# Patient Record
Sex: MALE | Race: OTHER | Employment: FULL TIME | URBAN - METROPOLITAN AREA
[De-identification: names, ages, dates, MRNs, and addresses within clinical notes are randomized per-mention and may not be internally consistent; named-entity substitution may affect disease eponyms.]

---

## 2021-06-29 ENCOUNTER — APPOINTMENT (OUTPATIENT)
Dept: GENERAL RADIOLOGY | Age: 31
DRG: 988 | End: 2021-06-29

## 2021-06-29 ENCOUNTER — HOSPITAL ENCOUNTER (INPATIENT)
Age: 31
LOS: 2 days | Discharge: HOME | DRG: 988 | End: 2021-07-01
Attending: EMERGENCY MEDICINE | Admitting: INTERNAL MEDICINE

## 2021-06-29 DIAGNOSIS — L03.012 CELLULITIS OF LEFT THUMB: Primary | ICD-10-CM

## 2021-06-29 DIAGNOSIS — Z23 TETANUS TOXOID VACCINATION ADMINISTERED AT CURRENT VISIT: ICD-10-CM

## 2021-06-29 PROBLEM — L03.119 CELLULITIS OF HAND: Status: ACTIVE | Noted: 2021-06-29

## 2021-06-29 LAB
A/G RATIO: 1.2 (ref 1.1–2.2)
ALBUMIN SERPL-MCNC: 4.4 G/DL (ref 3.4–5)
ALP BLD-CCNC: 76 U/L (ref 40–129)
ALT SERPL-CCNC: 20 U/L (ref 10–40)
ANION GAP SERPL CALCULATED.3IONS-SCNC: 12 MMOL/L (ref 3–16)
AST SERPL-CCNC: 18 U/L (ref 15–37)
BASOPHILS ABSOLUTE: 0 K/UL (ref 0–0.2)
BASOPHILS RELATIVE PERCENT: 0.2 %
BILIRUB SERPL-MCNC: 0.9 MG/DL (ref 0–1)
BUN BLDV-MCNC: 11 MG/DL (ref 7–20)
CALCIUM SERPL-MCNC: 9.9 MG/DL (ref 8.3–10.6)
CHLORIDE BLD-SCNC: 102 MMOL/L (ref 99–110)
CO2: 24 MMOL/L (ref 21–32)
CREAT SERPL-MCNC: 0.8 MG/DL (ref 0.9–1.3)
EOSINOPHILS ABSOLUTE: 0.1 K/UL (ref 0–0.6)
EOSINOPHILS RELATIVE PERCENT: 1 %
GFR AFRICAN AMERICAN: >60
GFR NON-AFRICAN AMERICAN: >60
GLOBULIN: 3.7 G/DL
GLUCOSE BLD-MCNC: 107 MG/DL (ref 70–99)
HCT VFR BLD CALC: 40.5 % (ref 40.5–52.5)
HEMOGLOBIN: 14 G/DL (ref 13.5–17.5)
LYMPHOCYTES ABSOLUTE: 1 K/UL (ref 1–5.1)
LYMPHOCYTES RELATIVE PERCENT: 8.1 %
MCH RBC QN AUTO: 30.8 PG (ref 26–34)
MCHC RBC AUTO-ENTMCNC: 34.5 G/DL (ref 31–36)
MCV RBC AUTO: 89.1 FL (ref 80–100)
MONOCYTES ABSOLUTE: 1 K/UL (ref 0–1.3)
MONOCYTES RELATIVE PERCENT: 8.1 %
NEUTROPHILS ABSOLUTE: 9.9 K/UL (ref 1.7–7.7)
NEUTROPHILS RELATIVE PERCENT: 82.6 %
PDW BLD-RTO: 13.2 % (ref 12.4–15.4)
PLATELET # BLD: 218 K/UL (ref 135–450)
PMV BLD AUTO: 8.6 FL (ref 5–10.5)
POTASSIUM REFLEX MAGNESIUM: 3.6 MMOL/L (ref 3.5–5.1)
RBC # BLD: 4.55 M/UL (ref 4.2–5.9)
SARS-COV-2, NAAT: NOT DETECTED
SODIUM BLD-SCNC: 138 MMOL/L (ref 136–145)
TOTAL PROTEIN: 8.1 G/DL (ref 6.4–8.2)
WBC # BLD: 12.1 K/UL (ref 4–11)

## 2021-06-29 PROCEDURE — 2500000003 HC RX 250 WO HCPCS: Performed by: INTERNAL MEDICINE

## 2021-06-29 PROCEDURE — 80053 COMPREHEN METABOLIC PANEL: CPT

## 2021-06-29 PROCEDURE — 73130 X-RAY EXAM OF HAND: CPT

## 2021-06-29 PROCEDURE — 96365 THER/PROPH/DIAG IV INF INIT: CPT

## 2021-06-29 PROCEDURE — 96375 TX/PRO/DX INJ NEW DRUG ADDON: CPT

## 2021-06-29 PROCEDURE — 6360000002 HC RX W HCPCS: Performed by: INTERNAL MEDICINE

## 2021-06-29 PROCEDURE — 99252 IP/OBS CONSLTJ NEW/EST SF 35: CPT | Performed by: NURSE PRACTITIONER

## 2021-06-29 PROCEDURE — 85025 COMPLETE CBC W/AUTO DIFF WBC: CPT

## 2021-06-29 PROCEDURE — 6360000002 HC RX W HCPCS: Performed by: EMERGENCY MEDICINE

## 2021-06-29 PROCEDURE — 1200000000 HC SEMI PRIVATE

## 2021-06-29 PROCEDURE — 2580000003 HC RX 258: Performed by: INTERNAL MEDICINE

## 2021-06-29 PROCEDURE — 99283 EMERGENCY DEPT VISIT LOW MDM: CPT

## 2021-06-29 PROCEDURE — 90471 IMMUNIZATION ADMIN: CPT | Performed by: EMERGENCY MEDICINE

## 2021-06-29 PROCEDURE — 90715 TDAP VACCINE 7 YRS/> IM: CPT | Performed by: EMERGENCY MEDICINE

## 2021-06-29 PROCEDURE — 87635 SARS-COV-2 COVID-19 AMP PRB: CPT

## 2021-06-29 PROCEDURE — 2500000003 HC RX 250 WO HCPCS: Performed by: EMERGENCY MEDICINE

## 2021-06-29 RX ORDER — KETOROLAC TROMETHAMINE 30 MG/ML
30 INJECTION, SOLUTION INTRAMUSCULAR; INTRAVENOUS ONCE
Status: COMPLETED | OUTPATIENT
Start: 2021-06-29 | End: 2021-06-29

## 2021-06-29 RX ORDER — SODIUM CHLORIDE 9 MG/ML
25 INJECTION, SOLUTION INTRAVENOUS PRN
Status: DISCONTINUED | OUTPATIENT
Start: 2021-06-29 | End: 2021-07-01 | Stop reason: HOSPADM

## 2021-06-29 RX ORDER — SODIUM CHLORIDE 0.9 % (FLUSH) 0.9 %
5-40 SYRINGE (ML) INJECTION EVERY 12 HOURS SCHEDULED
Status: DISCONTINUED | OUTPATIENT
Start: 2021-06-29 | End: 2021-07-01 | Stop reason: HOSPADM

## 2021-06-29 RX ORDER — CLINDAMYCIN PHOSPHATE 600 MG/50ML
600 INJECTION INTRAVENOUS ONCE
Status: COMPLETED | OUTPATIENT
Start: 2021-06-29 | End: 2021-06-29

## 2021-06-29 RX ORDER — ONDANSETRON 2 MG/ML
4 INJECTION INTRAMUSCULAR; INTRAVENOUS EVERY 6 HOURS PRN
Status: DISCONTINUED | OUTPATIENT
Start: 2021-06-29 | End: 2021-07-01 | Stop reason: HOSPADM

## 2021-06-29 RX ORDER — POTASSIUM CHLORIDE 7.45 MG/ML
10 INJECTION INTRAVENOUS PRN
Status: DISCONTINUED | OUTPATIENT
Start: 2021-06-29 | End: 2021-07-01 | Stop reason: HOSPADM

## 2021-06-29 RX ORDER — POTASSIUM CHLORIDE 20 MEQ/1
40 TABLET, EXTENDED RELEASE ORAL PRN
Status: DISCONTINUED | OUTPATIENT
Start: 2021-06-29 | End: 2021-07-01 | Stop reason: HOSPADM

## 2021-06-29 RX ORDER — MAGNESIUM SULFATE IN WATER 40 MG/ML
2000 INJECTION, SOLUTION INTRAVENOUS PRN
Status: DISCONTINUED | OUTPATIENT
Start: 2021-06-29 | End: 2021-07-01 | Stop reason: HOSPADM

## 2021-06-29 RX ORDER — ONDANSETRON 4 MG/1
4 TABLET, ORALLY DISINTEGRATING ORAL EVERY 8 HOURS PRN
Status: DISCONTINUED | OUTPATIENT
Start: 2021-06-29 | End: 2021-07-01 | Stop reason: HOSPADM

## 2021-06-29 RX ORDER — CLINDAMYCIN PHOSPHATE 600 MG/50ML
600 INJECTION INTRAVENOUS EVERY 8 HOURS
Status: DISCONTINUED | OUTPATIENT
Start: 2021-06-29 | End: 2021-07-01 | Stop reason: HOSPADM

## 2021-06-29 RX ORDER — ACETAMINOPHEN 650 MG/1
650 SUPPOSITORY RECTAL EVERY 6 HOURS PRN
Status: DISCONTINUED | OUTPATIENT
Start: 2021-06-29 | End: 2021-07-01 | Stop reason: HOSPADM

## 2021-06-29 RX ORDER — ACETAMINOPHEN 325 MG/1
650 TABLET ORAL EVERY 6 HOURS PRN
Status: DISCONTINUED | OUTPATIENT
Start: 2021-06-29 | End: 2021-07-01 | Stop reason: HOSPADM

## 2021-06-29 RX ORDER — POLYETHYLENE GLYCOL 3350 17 G/17G
17 POWDER, FOR SOLUTION ORAL DAILY PRN
Status: DISCONTINUED | OUTPATIENT
Start: 2021-06-29 | End: 2021-07-01 | Stop reason: HOSPADM

## 2021-06-29 RX ORDER — SODIUM CHLORIDE 0.9 % (FLUSH) 0.9 %
5-40 SYRINGE (ML) INJECTION PRN
Status: DISCONTINUED | OUTPATIENT
Start: 2021-06-29 | End: 2021-07-01 | Stop reason: HOSPADM

## 2021-06-29 RX ADMIN — TETANUS TOXOID, REDUCED DIPHTHERIA TOXOID AND ACELLULAR PERTUSSIS VACCINE, ADSORBED 0.5 ML: 5; 2.5; 8; 8; 2.5 SUSPENSION INTRAMUSCULAR at 15:14

## 2021-06-29 RX ADMIN — KETOROLAC TROMETHAMINE 30 MG: 30 INJECTION, SOLUTION INTRAMUSCULAR at 15:14

## 2021-06-29 RX ADMIN — Medication 10 ML: at 20:52

## 2021-06-29 RX ADMIN — ENOXAPARIN SODIUM 40 MG: 40 INJECTION SUBCUTANEOUS at 20:49

## 2021-06-29 RX ADMIN — CLINDAMYCIN PHOSPHATE 600 MG: 600 INJECTION, SOLUTION INTRAVENOUS at 22:59

## 2021-06-29 RX ADMIN — CLINDAMYCIN PHOSPHATE 600 MG: 600 INJECTION, SOLUTION INTRAVENOUS at 15:16

## 2021-06-29 ASSESSMENT — PAIN DESCRIPTION - DESCRIPTORS: DESCRIPTORS: THROBBING;DISCOMFORT

## 2021-06-29 ASSESSMENT — PAIN DESCRIPTION - PROGRESSION: CLINICAL_PROGRESSION: GRADUALLY WORSENING

## 2021-06-29 ASSESSMENT — PAIN DESCRIPTION - LOCATION: LOCATION: HAND

## 2021-06-29 ASSESSMENT — PAIN SCALES - GENERAL
PAINLEVEL_OUTOF10: 7
PAINLEVEL_OUTOF10: 5
PAINLEVEL_OUTOF10: 5

## 2021-06-29 ASSESSMENT — PAIN DESCRIPTION - ORIENTATION: ORIENTATION: LEFT

## 2021-06-29 ASSESSMENT — PAIN DESCRIPTION - FREQUENCY: FREQUENCY: CONTINUOUS

## 2021-06-29 ASSESSMENT — PAIN DESCRIPTION - ONSET: ONSET: ON-GOING

## 2021-06-29 ASSESSMENT — PAIN DESCRIPTION - PAIN TYPE: TYPE: ACUTE PAIN

## 2021-06-29 ASSESSMENT — PAIN - FUNCTIONAL ASSESSMENT: PAIN_FUNCTIONAL_ASSESSMENT: PREVENTS OR INTERFERES SOME ACTIVE ACTIVITIES AND ADLS

## 2021-06-29 NOTE — ED NOTES
Bed: 22  Expected date:   Expected time:   Means of arrival:   Comments:  Kip Julio RN  06/29/21 2572

## 2021-06-29 NOTE — H&P
Hospital Medicine History & Physical      PCP: No primary care provider on file. Date of Admission: 6/29/2021    Date of Service: Pt seen/examined on 6/29/2021 and Admitted to Inpatient with expected LOS greater than two midnights due to medical therapy. Chief Complaint: Left first digit cellulitis      History Of Present Illness: 31 y.o. male with no past medical history presents to [facility] ER with swelling redness and pain in the left first digit. Patient states that redness and swelling started Friday/Saturday. Does not recall any trauma or other insult. Has been getting progressively worse since then. Today he became concerned with the amount of swelling decided to come to the ER. Patient has no other complaints. No fever, no chills, no chest pain, no shortness of breath, no abdominal complaints. Orthopedic surgery consulted from the ED, recommended admission for IV antibiotics and possible or tomorrow. Past Medical History:      History reviewed. No pertinent past medical history. Past Surgical History:      History reviewed. No pertinent surgical history. Medications Prior to Admission:      Prior to Admission medications    Not on File       Allergies:  Patient has no known allergies. Social History:      The patient currently lives home    TOBACCO:   reports that he has been smoking cigarettes. He has been smoking about 1.00 pack per day. He has never used smokeless tobacco.  ETOH:   reports previous alcohol use. E-Cigarettes/Vaping Use     Questions Responses    E-Cigarette/Vaping Use     Start Date     Passive Exposure     Quit Date     Counseling Given     Comments             Family History:       Reviewed in detail and negative for DM, CAD, Cancer, CVA. Positive as follows:    History reviewed. No pertinent family history. REVIEW OF SYSTEMS:   Pertinent positives as noted in the HPI. All other systems reviewed and negative.     PHYSICAL EXAM PERFORMED:    BP 99/71   Pulse 73   Temp 98.2 °F (36.8 °C) (Oral)   Resp 16   Ht 6' 2\" (1.88 m)   Wt 250 lb (113.4 kg)   SpO2 99%   BMI 32.10 kg/m²     General appearance:  No apparent distress, appears stated age and cooperative. HEENT:  Normal cephalic, atraumatic without obvious deformity. Pupils equal, round, and reactive to light. Extra ocular muscles intact. Conjunctivae/corneas clear. Neck: Supple, with full range of motion. No jugular venous distention. Trachea midline. Respiratory:  Normal respiratory effort. Clear to auscultation, bilaterally without Rales/Wheezes/Rhonchi. Cardiovascular:  Regular rate and rhythm with normal S1/S2 without murmurs, rubs or gallops. Abdomen: Soft, non-tender, non-distended with normal bowel sounds. Musculoskeletal: Left first digit and thenar swelling redness and heat consistent with acute infection. Skin: Skin color, texture, turgor normal.  No rashes or lesions. Neurologic:  Neurovascularly intact without any focal sensory/motor deficits. Cranial nerves: II-XII intact, grossly non-focal.  Psychiatric:  Alert and oriented, thought content appropriate, normal insight  Capillary Refill: Brisk,< 3 seconds   Peripheral Pulses: +2 palpable, equal bilaterally       Labs:     Recent Labs     06/29/21  1512   WBC 12.1*   HGB 14.0   HCT 40.5        Recent Labs     06/29/21  1512      K 3.6      CO2 24   BUN 11   CREATININE 0.8*   CALCIUM 9.9     Recent Labs     06/29/21  1512   AST 18   ALT 20   BILITOT 0.9   ALKPHOS 76     No results for input(s): INR in the last 72 hours. No results for input(s): Reyne Sami in the last 72 hours.     Urinalysis:    No results found for: Milton Parent, BACTERIA, [facility], BLOODU, [address], [facility]    Radiology:     CXR: I have reviewed the CXR with the following interpretation: Not available  EKG:  I have reviewed the EKG with the following interpretation: Not available    XR HAND LEFT (MIN 3 VIEWS)   Final Result   No bony abnormality             ASSESSMENT:    Active Hospital Problems    Diagnosis Date Noted    Cellulitis of hand [L03.119] 2021         PLAN:    Left hand cellulitis  Started on clindamycin in the ED, will continue  IV fluids  N.p.o. post midnight for possible OR  Orthopedic surgery consultation    DVT Prophylaxis: Lovenox  Diet: Diet NPO  ADULT DIET;  Regular  Code Status: Full Code    Electronically signed by Rosamaria Mcintyre MD on 2021 at 5:28 PM

## 2021-06-29 NOTE — ED NOTES
Patient alert and oriented, states pain is 5/10. Medicated per MAR. Bed locked and in lowest position. Call light within reach. Patient advised to call before getting out of bed.      Reyna Alvarez RN  06/29/21 [id number]

## 2021-06-29 NOTE — ED PROVIDER NOTES
[address] [place] [address]  Chief Complaint   Patient presents with    Cellulitis     Pt with cellulitis to left thumb, states he was bit by something on Saturday, noticed swelling, warmth, and redness on Sunday. HISTORY OF PRESENT ILLNESS  Barrett Weir is a 31 y.o. male who presents to the ED complaining of left thumb infection and pain. He says he sleeps in the basement and may have been bit by a spider or something like that on Saturday when he first noticed the symptoms although did not see an insect at all. Since then he has had progression throughout the entire left of swelling redness and discomfort, inability to flex the IP joint and limited range of motion of the MCP joint as well because of his swelling and redness. He also notes on the palmar surface of the thumb yellowish discoloration to the skin. No fevers. Right hand dominant. Redness extends to the left thenar eminence but nothing tracking beyond the wrist or involving the other fingers. No other complaints, modifying factors or associated symptoms. Nursing notes reviewed. History reviewed. No pertinent past medical history. History reviewed. No pertinent surgical history. History reviewed. No pertinent family history.   Social History     Socioeconomic History    Marital status: Not on file     Spouse name: Not on file    Number of children: Not on file    Years of education: Not on file    Highest education level: Not on file   Occupational History    Not on file   Tobacco Use    Smoking status: Current Every Day Smoker     Packs/day: 1.00     Types: Cigarettes    Smokeless tobacco: Never Used   Substance and Sexual Activity    Alcohol use: Not Currently    Drug use: Yes     Types: Marijuana    Sexual activity: Not on file   Other Topics Concern    Not on file   Social History Narrative    Not on file     Social Determinants of Health     Financial Resource Strain:     Difficulty of Paying Living Expenses:    Food Insecurity:     Worried About [address] in the Last Year:     [address] in the Last Year:    Transportation Needs:     Lack of Transportation (Medical):  Lack of Transportation (Non-Medical):    Physical Activity:     Days of Exercise per Week:     Minutes of Exercise per Session:    Stress:     Feeling of Stress :    Social Connections:     Frequency of Communication with Friends and Family:     Frequency of Social Gatherings with Friends and Family:     Attends [employer] Services:     Active Member of Clubs or Organizations:     Attends Club or Organization Meetings:     Marital Status:    Intimate Partner Violence:     Fear of Current or Ex-Partner:     Emotionally Abused:     Physically Abused:     Sexually Abused:      No current facility-administered medications for this encounter. No current outpatient medications on file. No Known Allergies    REVIEW OF SYSTEMS  6 systems reviewed, pertinent positives per HPI otherwise noted to be negative    PHYSICAL EXAM   BP (!) 147/93   Pulse 94   Temp 98.2 °F (36.8 °C) (Oral)   Resp 16   Ht 6' 2\" (1.88 m)   Wt 250 lb (113.4 kg)   SpO2 98%   BMI 32.10 kg/m²    GENERAL APPEARANCE: Awake and alert. Cooperative. No acute distress. HEAD: Normocephalic. Atraumatic. EYES: PERRL. EOM's grossly intact. ENT: Mucous membranes are moist.   NECK: Supple. Normal ROM. CHEST: Equal symmetric chest rise. LUNGS: Breathing is unlabored. Speaking comfortably in full sentences. ABDOMEN: Nondistended, nontender  SKIN: Warm and dry. No acute rashes. NEUROLOGICAL: Alert and oriented. Strength is 5/5 in all extremities and sensation is intact. MUSCULOSKELETAL:  RUE: No tenderness. 2+ radial pulse. Brisk cap refill x5 digits. Sensation and motor function fully intact in the radial, ulnar, and median nerve distribution. Full range of motion of all major joints.   Cardinal movements of hand fully intact. No erythema, bruising, or lacerations. Comparments are soft. LUE: Left thumb as depicted below. On the palmar surface there is a yellowish discoloration concerning for purulence. There is limited IP and MCP joint range of motion and tenderness over the flexor tendons of the left thumb as well as erythema spreading to the thenar eminence but no proximal streaking beyond there. No other phalanges are tender erythematous swollen or seemingly involved. There does not appear to be any specific swelling or inflammation to the nailbed itself or obvious paronychia or felon. Aside from the left thumb, no other LUE tenderness. 2+ radial pulse. Brisk cap refill x5 digits. Sensation and motor function fully intact in the radial, ulnar, and median nerve distribution. Full range of motion of all other major joints. Cardinal movements of hand fully intact. No erythema, bruising, or lacerations. Comparments are soft. RADIOLOGY    XR HAND LEFT (MIN 3 VIEWS)    Result Date: 6/29/2021  EXAMINATION: THREE XRAY VIEWS OF THE LEFT HAND 6/29/2021 3:10 pm COMPARISON: None. HISTORY: ORDERING SYSTEM PROVIDED HISTORY: infxn left thumb/MCP TECHNOLOGIST PROVIDED HISTORY: Reason for exam:->infxn left thumb/MCP Reason for Exam: Cellulitis (Pt with cellulitis to left thumb, states he was bit by something on Saturday, noticed swelling, warmth, and redness on Sunday. ) Acuity: Acute Type of Exam: Initial FINDINGS: No fracture or dislocation. No destructive bone lesion or periostitis. No radiopaque foreign body or soft tissue gas. Soft tissue swelling of the thumb noted     No bony abnormality       LABS  I have reviewed all labs for this visit.    Results for orders placed or performed during the hospital encounter of 06/29/21   CBC auto differential   Result Value Ref Range    WBC 12.1 (H) 4.0 - 11.0 K/uL    RBC 4.55 4.20 - 5.90 M/uL    Hemoglobin 14.0 13.5 - 17.5 g/dL    Hematocrit 40.5 40.5 - 52.5 %    MCV 89.1 80.0 - 100.0 fL    MCH 30.8 26.0 - 34.0 pg    MCHC 34.5 31.0 - 36.0 g/dL    RDW 13.2 12.4 - 15.4 %    Platelets 548 976 - 685 K/uL    MPV 8.6 5.0 - 10.5 fL    Neutrophils % 82.6 %    Lymphocytes % 8.1 %    Monocytes % 8.1 %    Eosinophils % 1.0 %    Basophils % 0.2 %    Neutrophils Absolute 9.9 (H) 1.7 - 7.7 K/uL    Lymphocytes Absolute 1.0 1.0 - 5.1 K/uL    Monocytes Absolute 1.0 0.0 - 1.3 K/uL    Eosinophils Absolute 0.1 0.0 - 0.6 K/uL    Basophils Absolute 0.0 0.0 - 0.2 K/uL   Comprehensive Metabolic Panel w/ Reflex to MG   Result Value Ref Range    Sodium 138 136 - 145 mmol/L    Potassium reflex Magnesium 3.6 3.5 - 5.1 mmol/L    Chloride 102 99 - 110 mmol/L    CO2 24 21 - 32 mmol/L    Anion Gap 12 3 - 16    Glucose 107 (H) 70 - 99 mg/dL    BUN 11 7 - 20 mg/dL    CREATININE 0.8 (L) 0.9 - 1.3 mg/dL    GFR Non-African American >60 >60    GFR African American >60 >60    Calcium 9.9 8.3 - 10.6 mg/dL    Total Protein 8.1 6.4 - 8.2 g/dL    Albumin 4.4 3.4 - 5.0 g/dL    Albumin/Globulin Ratio 1.2 1.1 - 2.2    Total Bilirubin 0.9 0.0 - 1.0 mg/dL    Alkaline Phosphatase 76 40 - 129 U/L    ALT 20 10 - 40 U/L    AST 18 15 - 37 U/L    Globulin 3.7 g/dL     ED COURSE/MDM  Differential diagnosis considerations included: cellulitis, abscess, necrotizing fasciitis, ulceration, flexor tenosynovitis    The patient's ED course was notable for some cellulitis with some concern for flexor tenosynovitis or abscess. Patient vitals are reassuring not indicative of sepsis or systemic illness. He is a borderline white count of 12.1. Otherwise labs are unremarkable. Tetanus was updated. Liz Beckett from orthopedics was consulted about the patient's ED history, physical, workup, and course so far.   Recommendations from this consultant included prompt ED assessment, after which we discussed the case and agreement with clindamycin that was ordered by me, plan to admit to the hospitalist and reassess in the morning to consider operative intervention if not improved with conservative management. CLINICAL IMPRESSION  1. Cellulitis of left thumb    2. Tetanus toxoid vaccination administered at current visit        Blood pressure (!) 147/93, pulse 94, temperature 98.2 °F (36.8 °C), temperature source Oral, resp. rate 16, height 6' 2\" (1.88 m), weight 250 lb (113.4 kg), SpO2 98 %. DISPOSITION    The plan is to admit to the hospital at this time under the hospitalist service. Hospitalist accepted the patient and will take over the patient's care. This chart was created using Dragon dictation software. Efforts were made by me to ensure accuracy, however some errors may be present due to limitations of this technology.         Jair Cedeño MD  06/29/21 [id number]

## 2021-06-29 NOTE — CONSULTS
[address] [facility] Orthopedic Surgery  Consult Note    Patient: Mary Kate Marie  Admit Date: 6/29/2021  Requesting Physician: Dr. Eduardo Reina  Room: ED-0022/22    Chief complaint: LEFT thumb pain/swelling. HPI: Mary Kate Marie is a 31 y.o. male who presented to [facility] ER with complaints of progressive swelling and redness of the left thumb without apparent insult/injury since Sunday, 6/27/21. He is R[employer] and works in [employer]. Last ate at noon today. Describes pain in the left thumb of mild intensity and of throbbing nature since Saturday which is relieved partially by advil. Denies new numbness/tingling. Tried advil PM and ibuprofen with some benefit. Imaging review of the left hand via plain films demonstrated: no fracture, malalignment or foreign body. No signs of periosteal reaction or osteomyelitis. Interestingly he also had a superficial abscess left middle finger lanced in ER at [facility] on 3/14 and was discharged home with oral abx. From review of his chart he did test positive for cocaine on 7/30/19 but reports he does not use any illicit drugs currently - only THC. He has been afebrile. Medical History:  History reviewed. No pertinent past medical history. History reviewed. No pertinent surgical history. Social History:    reports that he has been smoking cigarettes. He has been smoking about 1.00 pack per day. He has never used smokeless tobacco.    Family History:  History reviewed. No pertinent family history. Medications:  ALL MEDICATIONS HAVE BEEN REVIEWED:  Scheduled:    Continuous:  PRN:    Allergies: No Known Allergies    Review of Systems:  Constitutional: Negative for fever, chills, fatigue. Skin:  Negative for pruritis, rash  Eyes: Negative for photophobia and visual disturbance. ENT:  Negative for rhinorrhea, epistaxis, sore throat  Respiratory:  Negative for cough and shortness of breath. Cardiovascular: Negative for chest pain.    Gastrointestinal: Negative for nausea, vomiting, diarrhea. Genitourinary: Negative for dysuria and difficulty urinating. Neurological: Negative for confusion, dysarthria, tremors, seizures. Psychiatric:  Negative for depression or anxiety  Musculoskeletal:  Positive for left thumb swelling/erythema. Objective:  Vitals:    06/29/21 1441   BP: (!) 147/93   Pulse: 94   Resp: 16   Temp: 98.2 °F (36.8 °C)   SpO2: 98%      Physical Examination:  GENERAL: No apparent distress, well-nourished  SKIN:  Warm and dry  EYES: Nonicteric. ENT: Mucous membranes moist  HEAD: Normocephalic, atraumatic  RESPIRATORY: Resp easy and unlabored  CARDIOVASCULAR: Regular rate and rhythm  GI: Abdomen soft, nontender  NEURO: Awake and alert. No speech defect  PSYCHIATRIC: Appropriate affect; not agitated  MUSCULOSKELETAL:  Left hand  Inspection: On exam there are no ulcerations, rashes or lesions about the left hand. No puncture sites noted. He has diffuse swelling and erythema about the thumb mainly over palmar surface. Yellow discoloration noted over the palmar aspect of the thumb centered about the proximal phalanx. There is no pain to palpation of the flexor tendon. No pain with passive full extension of the thumb. He has mildly decreased ROM of the IP and MCP secondary to swelling. No pain with active abduction of the thumb. No pain with palpation of the thenar musculature. No fluctuance noted. Motor: Intact and full wrist flexion and extension without pain. Sensation: Grossly intact to light touch throughout the left hand and thumb. Vascular:  2+ radial pulse, brisk cap refill in all digits. Labs reviewed:  Recent Labs     06/29/21  1512   WBC 12.1*   HGB 14.0   HCT 40.5        Recent Labs     06/29/21  1512      K 3.6      CO2 24   BUN 11   CREATININE 0.8*   GLUCOSE 107*   CALCIUM 9.9     No results for input(s): INR, PROTIME in the last 72 hours.     No results found for: Nakita Shine [address], PHUR, LABSPEC, GLUCOSEU, BLOODU, LEUKOCYTESUR, NITRITE, BILIRUBINUR, UROBILINOGEN, RBCUA, WBCUA, BACTERIA, AMORPHOUS, CRYSTAL    Imaging:  XR HAND LEFT (MIN 3 VIEWS)   Final Result   No bony abnormality             IMPRESSION:  LEFT thumb cellulitis vs abscess  Tobacco abuse  Active Problems:    * No active hospital problems. *  Resolved Problems:    * No resolved hospital problems. *      RECOMMENDATIONS:  We discussed both operative and non-operative treatments. Recommend admit to hospital team for IV abx and reassessment in the AM.  His exam is NOT consistent with flexor tenosynovitis currently.   - NPO after midnight  - Strict elevation of the left hand. - Pain control  - DVT prophylaxis: per primary team; he can ambulate >250ft.  - ID: Continue IV abx  - rapid COVID swab in case of I&D tomorrow. He has not been vaccinated. - Dispo: Await re-evaluation tomorrow morning     Patient has been counseled about the detrimental effects of smoking and the need to eliminate or significantly decrease their tobacco use. I would suggest discussing this issue with their medical doctor. I would also highly recommend the immediate cessation of all forms of tobacco use. He declines nicotine patch. I have reviewed imaging and plan with Dr. Davina Bullard.       Darylene Lacks, BETH - CNP  6/29/2021  3:55 PM

## 2021-06-29 NOTE — PROGRESS NOTES
Assessment completed. Resident transferred to floor via w/c and transferred self to bed; alert and oriented x 4 and able to make all needs known. Oriented to room and call light. Family at bedside. All needs met at this time. Call light in reach.

## 2021-06-30 ENCOUNTER — ANESTHESIA (OUTPATIENT)
Dept: OPERATING ROOM | Age: 31
DRG: 988 | End: 2021-06-30

## 2021-06-30 ENCOUNTER — ANESTHESIA EVENT (OUTPATIENT)
Dept: OPERATING ROOM | Age: 31
DRG: 988 | End: 2021-06-30

## 2021-06-30 VITALS
DIASTOLIC BLOOD PRESSURE: 74 MMHG | TEMPERATURE: 97.5 F | SYSTOLIC BLOOD PRESSURE: 126 MMHG | OXYGEN SATURATION: 100 % | RESPIRATION RATE: 13 BRPM

## 2021-06-30 PROCEDURE — 2500000003 HC RX 250 WO HCPCS: Performed by: NURSE ANESTHETIST, CERTIFIED REGISTERED

## 2021-06-30 PROCEDURE — 3600000012 HC SURGERY LEVEL 2 ADDTL 15MIN: Performed by: ORTHOPAEDIC SURGERY

## 2021-06-30 PROCEDURE — 2580000003 HC RX 258: Performed by: INTERNAL MEDICINE

## 2021-06-30 PROCEDURE — 3700000001 HC ADD 15 MINUTES (ANESTHESIA): Performed by: ORTHOPAEDIC SURGERY

## 2021-06-30 PROCEDURE — 6370000000 HC RX 637 (ALT 250 FOR IP): Performed by: ANESTHESIOLOGY

## 2021-06-30 PROCEDURE — 2500000003 HC RX 250 WO HCPCS: Performed by: ORTHOPAEDIC SURGERY

## 2021-06-30 PROCEDURE — 87070 CULTURE OTHR SPECIMN AEROBIC: CPT

## 2021-06-30 PROCEDURE — 6360000002 HC RX W HCPCS: Performed by: ORTHOPAEDIC SURGERY

## 2021-06-30 PROCEDURE — 3600000002 HC SURGERY LEVEL 2 BASE: Performed by: ORTHOPAEDIC SURGERY

## 2021-06-30 PROCEDURE — 87205 SMEAR GRAM STAIN: CPT

## 2021-06-30 PROCEDURE — 87077 CULTURE AEROBIC IDENTIFY: CPT

## 2021-06-30 PROCEDURE — 87186 SC STD MICRODIL/AGAR DIL: CPT

## 2021-06-30 PROCEDURE — 94760 N-INVAS EAR/PLS OXIMETRY 1: CPT

## 2021-06-30 PROCEDURE — 0L980ZZ DRAINAGE OF LEFT HAND TENDON, OPEN APPROACH: ICD-10-PCS | Performed by: ORTHOPAEDIC SURGERY

## 2021-06-30 PROCEDURE — 87075 CULTR BACTERIA EXCEPT BLOOD: CPT

## 2021-06-30 PROCEDURE — 2580000003 HC RX 258: Performed by: ORTHOPAEDIC SURGERY

## 2021-06-30 PROCEDURE — 86403 PARTICLE AGGLUT ANTBDY SCRN: CPT

## 2021-06-30 PROCEDURE — 3700000000 HC ANESTHESIA ATTENDED CARE: Performed by: ORTHOPAEDIC SURGERY

## 2021-06-30 PROCEDURE — APPNB45 APP NON BILLABLE 31-45 MINUTES: Performed by: NURSE PRACTITIONER

## 2021-06-30 PROCEDURE — 7100000001 HC PACU RECOVERY - ADDTL 15 MIN: Performed by: ORTHOPAEDIC SURGERY

## 2021-06-30 PROCEDURE — 2500000003 HC RX 250 WO HCPCS: Performed by: INTERNAL MEDICINE

## 2021-06-30 PROCEDURE — 2709999900 HC NON-CHARGEABLE SUPPLY: Performed by: ORTHOPAEDIC SURGERY

## 2021-06-30 PROCEDURE — 7100000000 HC PACU RECOVERY - FIRST 15 MIN: Performed by: ORTHOPAEDIC SURGERY

## 2021-06-30 PROCEDURE — 1200000000 HC SEMI PRIVATE

## 2021-06-30 PROCEDURE — 6360000002 HC RX W HCPCS: Performed by: NURSE ANESTHETIST, CERTIFIED REGISTERED

## 2021-06-30 RX ORDER — MIDAZOLAM HYDROCHLORIDE 1 MG/ML
INJECTION INTRAMUSCULAR; INTRAVENOUS PRN
Status: DISCONTINUED | OUTPATIENT
Start: 2021-06-30 | End: 2021-06-30 | Stop reason: SDUPTHER

## 2021-06-30 RX ORDER — FENTANYL CITRATE 50 UG/ML
INJECTION, SOLUTION INTRAMUSCULAR; INTRAVENOUS PRN
Status: DISCONTINUED | OUTPATIENT
Start: 2021-06-30 | End: 2021-06-30 | Stop reason: SDUPTHER

## 2021-06-30 RX ORDER — HYDROCODONE BITARTRATE AND ACETAMINOPHEN 5; 325 MG/1; MG/1
1 TABLET ORAL PRN
Status: COMPLETED | OUTPATIENT
Start: 2021-06-30 | End: 2021-06-30

## 2021-06-30 RX ORDER — PROMETHAZINE HYDROCHLORIDE 25 MG/ML
6.25 INJECTION, SOLUTION INTRAMUSCULAR; INTRAVENOUS EVERY 30 MIN PRN
Status: DISCONTINUED | OUTPATIENT
Start: 2021-06-30 | End: 2021-06-30

## 2021-06-30 RX ORDER — KETOROLAC TROMETHAMINE 30 MG/ML
30 INJECTION, SOLUTION INTRAMUSCULAR; INTRAVENOUS EVERY 6 HOURS
Status: DISCONTINUED | OUTPATIENT
Start: 2021-06-30 | End: 2021-07-01 | Stop reason: HOSPADM

## 2021-06-30 RX ORDER — FENTANYL CITRATE 50 UG/ML
50 INJECTION, SOLUTION INTRAMUSCULAR; INTRAVENOUS EVERY 5 MIN PRN
Status: DISCONTINUED | OUTPATIENT
Start: 2021-06-30 | End: 2021-06-30

## 2021-06-30 RX ORDER — HYDROMORPHONE HCL 110MG/55ML
0.5 PATIENT CONTROLLED ANALGESIA SYRINGE INTRAVENOUS EVERY 5 MIN PRN
Status: DISCONTINUED | OUTPATIENT
Start: 2021-06-30 | End: 2021-06-30

## 2021-06-30 RX ORDER — HYDROMORPHONE HCL 110MG/55ML
0.25 PATIENT CONTROLLED ANALGESIA SYRINGE INTRAVENOUS EVERY 5 MIN PRN
Status: DISCONTINUED | OUTPATIENT
Start: 2021-06-30 | End: 2021-06-30

## 2021-06-30 RX ORDER — SODIUM CHLORIDE 9 MG/ML
INJECTION, SOLUTION INTRAVENOUS CONTINUOUS
Status: DISCONTINUED | OUTPATIENT
Start: 2021-06-30 | End: 2021-06-30

## 2021-06-30 RX ORDER — FENTANYL CITRATE 50 UG/ML
25 INJECTION, SOLUTION INTRAMUSCULAR; INTRAVENOUS EVERY 5 MIN PRN
Status: DISCONTINUED | OUTPATIENT
Start: 2021-06-30 | End: 2021-06-30

## 2021-06-30 RX ORDER — MEPERIDINE HYDROCHLORIDE 25 MG/ML
12.5 INJECTION INTRAMUSCULAR; INTRAVENOUS; SUBCUTANEOUS EVERY 5 MIN PRN
Status: DISCONTINUED | OUTPATIENT
Start: 2021-06-30 | End: 2021-06-30

## 2021-06-30 RX ORDER — LIDOCAINE HYDROCHLORIDE 20 MG/ML
INJECTION, SOLUTION EPIDURAL; INFILTRATION; INTRACAUDAL; PERINEURAL PRN
Status: DISCONTINUED | OUTPATIENT
Start: 2021-06-30 | End: 2021-06-30 | Stop reason: SDUPTHER

## 2021-06-30 RX ORDER — DIPHENHYDRAMINE HYDROCHLORIDE 50 MG/ML
12.5 INJECTION INTRAMUSCULAR; INTRAVENOUS
Status: DISCONTINUED | OUTPATIENT
Start: 2021-06-30 | End: 2021-06-30

## 2021-06-30 RX ORDER — PROPOFOL 10 MG/ML
INJECTION, EMULSION INTRAVENOUS PRN
Status: DISCONTINUED | OUTPATIENT
Start: 2021-06-30 | End: 2021-06-30 | Stop reason: SDUPTHER

## 2021-06-30 RX ORDER — KETOROLAC TROMETHAMINE 30 MG/ML
30 INJECTION, SOLUTION INTRAMUSCULAR; INTRAVENOUS EVERY 6 HOURS PRN
Status: DISCONTINUED | OUTPATIENT
Start: 2021-06-30 | End: 2021-06-30

## 2021-06-30 RX ORDER — HYDROCODONE BITARTRATE AND ACETAMINOPHEN 5; 325 MG/1; MG/1
2 TABLET ORAL PRN
Status: COMPLETED | OUTPATIENT
Start: 2021-06-30 | End: 2021-06-30

## 2021-06-30 RX ADMIN — Medication 10 ML: at 08:45

## 2021-06-30 RX ADMIN — SODIUM CHLORIDE: 9 INJECTION, SOLUTION INTRAVENOUS at 13:09

## 2021-06-30 RX ADMIN — KETOROLAC TROMETHAMINE 30 MG: 30 INJECTION, SOLUTION INTRAMUSCULAR at 21:29

## 2021-06-30 RX ADMIN — LIDOCAINE HYDROCHLORIDE 50 MG: 20 INJECTION, SOLUTION EPIDURAL; INFILTRATION; INTRACAUDAL; PERINEURAL at 14:12

## 2021-06-30 RX ADMIN — FENTANYL CITRATE 50 MCG: 50 INJECTION, SOLUTION INTRAMUSCULAR; INTRAVENOUS at 14:12

## 2021-06-30 RX ADMIN — ENOXAPARIN SODIUM 40 MG: 40 INJECTION SUBCUTANEOUS at 21:30

## 2021-06-30 RX ADMIN — MIDAZOLAM 2 MG: 1 INJECTION INTRAMUSCULAR; INTRAVENOUS at 14:10

## 2021-06-30 RX ADMIN — CLINDAMYCIN PHOSPHATE 600 MG: 600 INJECTION, SOLUTION INTRAVENOUS at 07:41

## 2021-06-30 RX ADMIN — HYDROCODONE BITARTRATE AND ACETAMINOPHEN 2 TABLET: 5; 325 TABLET ORAL at 15:01

## 2021-06-30 RX ADMIN — FENTANYL CITRATE 50 MCG: 50 INJECTION, SOLUTION INTRAMUSCULAR; INTRAVENOUS at 14:29

## 2021-06-30 RX ADMIN — CLINDAMYCIN PHOSPHATE 600 MG: 600 INJECTION, SOLUTION INTRAVENOUS at 15:37

## 2021-06-30 RX ADMIN — PROPOFOL 200 MG: 10 INJECTION, EMULSION INTRAVENOUS at 14:12

## 2021-06-30 RX ADMIN — CLINDAMYCIN PHOSPHATE 600 MG: 600 INJECTION, SOLUTION INTRAVENOUS at 23:44

## 2021-06-30 RX ADMIN — Medication 10 ML: at 21:30

## 2021-06-30 RX ADMIN — KETOROLAC TROMETHAMINE 30 MG: 30 INJECTION, SOLUTION INTRAMUSCULAR at 15:27

## 2021-06-30 ASSESSMENT — PAIN DESCRIPTION - ORIENTATION
ORIENTATION: LEFT

## 2021-06-30 ASSESSMENT — PULMONARY FUNCTION TESTS
PIF_VALUE: 16
PIF_VALUE: 15
PIF_VALUE: 16
PIF_VALUE: 16
PIF_VALUE: 5
PIF_VALUE: 16
PIF_VALUE: 17
PIF_VALUE: 16
PIF_VALUE: 16
PIF_VALUE: 1
PIF_VALUE: 1
PIF_VALUE: 4
PIF_VALUE: 16
PIF_VALUE: 15
PIF_VALUE: 1
PIF_VALUE: 16
PIF_VALUE: 2
PIF_VALUE: 16
PIF_VALUE: 17
PIF_VALUE: 15

## 2021-06-30 ASSESSMENT — PAIN DESCRIPTION - LOCATION
LOCATION: FINGER (COMMENT WHICH ONE)
LOCATION: HAND
LOCATION: FINGER (COMMENT WHICH ONE)
LOCATION: FINGER (COMMENT WHICH ONE)

## 2021-06-30 ASSESSMENT — PAIN DESCRIPTION - DESCRIPTORS
DESCRIPTORS: ACHING
DESCRIPTORS: ACHING
DESCRIPTORS: THROBBING
DESCRIPTORS: ACHING
DESCRIPTORS: SORE;THROBBING

## 2021-06-30 ASSESSMENT — PAIN - FUNCTIONAL ASSESSMENT
PAIN_FUNCTIONAL_ASSESSMENT: 0-10
PAIN_FUNCTIONAL_ASSESSMENT: PREVENTS OR INTERFERES SOME ACTIVE ACTIVITIES AND ADLS

## 2021-06-30 ASSESSMENT — PAIN DESCRIPTION - FREQUENCY
FREQUENCY: INTERMITTENT
FREQUENCY: CONTINUOUS
FREQUENCY: INTERMITTENT
FREQUENCY: INTERMITTENT

## 2021-06-30 ASSESSMENT — PAIN SCALES - GENERAL
PAINLEVEL_OUTOF10: 6
PAINLEVEL_OUTOF10: 5
PAINLEVEL_OUTOF10: 4
PAINLEVEL_OUTOF10: 5
PAINLEVEL_OUTOF10: 6
PAINLEVEL_OUTOF10: 5
PAINLEVEL_OUTOF10: 3
PAINLEVEL_OUTOF10: 5
PAINLEVEL_OUTOF10: 6
PAINLEVEL_OUTOF10: 5

## 2021-06-30 ASSESSMENT — PAIN DESCRIPTION - ONSET
ONSET: ON-GOING

## 2021-06-30 ASSESSMENT — PAIN DESCRIPTION - PAIN TYPE
TYPE: SURGICAL PAIN
TYPE: SURGICAL PAIN
TYPE: ACUTE PAIN
TYPE: SURGICAL PAIN

## 2021-06-30 ASSESSMENT — PAIN DESCRIPTION - PROGRESSION
CLINICAL_PROGRESSION: NOT CHANGED
CLINICAL_PROGRESSION: NOT CHANGED
CLINICAL_PROGRESSION: GRADUALLY IMPROVING

## 2021-06-30 NOTE — PROGRESS NOTES
12.1*   HGB 14.0   HCT 40.5        Recent Labs     06/29/21  1512      K 3.6      CO2 24   BUN 11   CREATININE 0.8*   GLUCOSE 107*   CALCIUM 9.9     No results for input(s): INR, PROTIME in the last 72 hours. No results found for: Arpit Fu, PH, PHUR, LABSPEC, GLUCOSEU, BLOODU, LEUKOCYTESUR, NITRITE, BILIRUBINUR, UROBILINOGEN, RBCUA, WBCUA, BACTERIA, AMORPHOUS, CRYSTAL    Imaging:  XR HAND LEFT (MIN 3 VIEWS)   Final Result   No bony abnormality             IMPRESSION:  LEFT thumb developing abscess  Tobacco abuse    PLAN:  Recommend formal I&D today,2pm with Dr. Juan Francisco Alvarenga informed consent  - Strict elevation of the left hand. - Pain control  - DVT prophylaxis: per primary team; he can ambulate >250ft.  - ID: Continue IV abx  - rapid COVID swab negative 6/29/21.  - Dispo: likely d/c home with oral abx tomorrow.      BETH Davila - CNP  6/30/2021  8:23 AM     I have seen and examined the patient and agree with the above    Aissatou Lofton MD

## 2021-06-30 NOTE — ANESTHESIA PRE PROCEDURE
Department of Anesthesiology  Preprocedure Note       Name:  Priya Edwards   Age:  31 y.o.  :  1990                                          MRN:  [id number]         Date:  2021      Surgeon: Elpidio Cruz):  Aleena West MD    Procedure: Procedure(s):  LEFT THUMB DEBRIDEMENT INCISION AND DRAINAGE    Medications prior to admission:   Prior to Admission medications    Not on File       Current medications:    Current Facility-Administered Medications   Medication Dose Route Frequency Provider Last Rate Last Admin    0.9 % sodium chloride infusion   Intravenous Continuous Aleena West  mL/hr at 21 1309 New Bag at 21 1309    ketorolac (TORADOL) injection 30 mg  30 mg Intravenous Q6H Gardenia Justice PA-C        sodium chloride flush 0.9 % injection 5-40 mL  5-40 mL Intravenous 2 times per day Maria Isabel SCHAEFFER MD   10 mL at 21 0845    sodium chloride flush 0.9 % injection 5-40 mL  5-40 mL Intravenous PRN Ponce SCHAEFFER MD        0.9 % sodium chloride infusion  25 mL Intravenous PRN Ponce SCHAEFFER MD        enoxaparin (LOVENOX) injection 40 mg  40 mg Subcutaneous Nightly Ponce SCHAEFFER MD   40 mg at 21    ondansetron (ZOFRAN-ODT) disintegrating tablet 4 mg  4 mg Oral Q8H PRN Ponce Liao MD        Or    ondansetron (ZOFRAN) injection 4 mg  4 mg Intravenous Q6H PRN Ponce SCHAEFFER MD        polyethylene glycol (GLYCOLAX) packet 17 g  17 g Oral Daily PRN Ponce Liao MD        acetaminophen (TYLENOL) tablet 650 mg  650 mg Oral Q6H PRN Ponce SCHAEFFER MD        Or    acetaminophen (TYLENOL) suppository 650 mg  650 mg Rectal Q6H PRN Ponce Liao MD        potassium chloride (KLOR-CON M) extended release tablet 40 mEq  40 mEq Oral PRN Ponce SCHAEFFER MD        Or    potassium bicarb-citric acid (EFFER-K) effervescent tablet 40 mEq  40 mEq Oral PRN Ponce SCHAEFFER MD        Or    potassium chloride 10 mEq/100 mL IVPB (Peripheral Line)  10 mEq Intravenous PRN Ponce Andrew MD        magnesium sulfate 2000 mg in 50 mL IVPB premix  2,000 mg Intravenous PRN Ponce Andrew MD        clindamycin (CLEOCIN) 600 mg in dextrose 5 % 50 mL IVPB  600 mg Intravenous Q8H Ponce Andrew MD   Stopped at 06/30/21 0774       Allergies:  No Known Allergies    Problem List:    Patient Active Problem List   Diagnosis Code    Cellulitis of hand L03.119       Past Medical History:  History reviewed. No pertinent past medical history. Past Surgical History:  History reviewed. No pertinent surgical history. Social History:    Social History     Tobacco Use    Smoking status: Current Every Day Smoker     Packs/day: 1.00     Types: Cigarettes    Smokeless tobacco: Never Used   Substance Use Topics    Alcohol use: Not Currently                                Ready to quit: Not Answered  Counseling given: Not Answered      Vital Signs (Current):   Vitals:    06/29/21 2045 06/29/21 2352 06/30/21 0830 06/30/21 1306   BP: 124/78 (!) 150/84 (!) 151/86 (!) 142/85   Pulse: 77 75 77 85   Resp: 16 16 16 16   Temp: 98.4 °F (36.9 °C) 99.2 °F (37.3 °C) 98.2 °F (36.8 °C) 97.2 °F (36.2 °C)   TempSrc:  Oral Oral Temporal   SpO2: 98% 99% 97% 98%   Weight:    250 lb (113.4 kg)   Height:    6' 2\" (1.88 m)                                              BP Readings from Last 3 Encounters:   06/30/21 (!) 142/85       NPO Status: Time of last liquid consumption: 2350                        Time of last solid consumption: 2350                        Date of last liquid consumption: 06/29/21                        Date of last solid food consumption: 06/29/21    BMI:   Wt Readings from Last 3 Encounters:   06/30/21 250 lb (113.4 kg)     Body mass index is 32.1 kg/m².     CBC:   Lab Results   Component Value Date    WBC 12.1 06/29/2021    RBC 4.55 06/29/2021    HGB 14.0 06/29/2021    HCT 40.5 06/29/2021    MCV 89.1 06/29/2021    RDW 13.2 06/29/2021     06/29/2021       CMP:   Lab Results   Component Value Date     06/29/2021    K 3.6 06/29/2021     06/29/2021    CO2 24 06/29/2021    BUN 11 06/29/2021    CREATININE 0.8 06/29/2021    GFRAA >60 06/29/2021    AGRATIO 1.2 06/29/2021    LABGLOM >60 06/29/2021    GLUCOSE 107 06/29/2021    PROT 8.1 06/29/2021    CALCIUM 9.9 06/29/2021    BILITOT 0.9 06/29/2021    ALKPHOS 76 06/29/2021    AST 18 06/29/2021    ALT 20 06/29/2021       POC Tests: No results for input(s): POCGLU, POCNA, POCK, POCCL, POCBUN, POCHEMO, POCHCT in the last 72 hours. Coags: No results found for: PROTIME, INR, APTT    HCG (If Applicable): No results found for: PREGTESTUR, PREGSERUM, HCG, HCGQUANT     ABGs: No results found for: PHART, PO2ART, UIV5QDA, MYT5YEL, BEART, U1MLDQQU     Type & Screen (If Applicable):  No results found for: LABABO, LABRH    Drug/Infectious Status (If Applicable):  No results found for: HIV, HEPCAB    COVID-19 Screening (If Applicable):   Lab Results   Component Value Date    COVID19 Not Detected 06/29/2021           Anesthesia Evaluation  Patient summary reviewed and Nursing notes reviewed  Airway: Mallampati: II  TM distance: >3 FB   Neck ROM: full  Mouth opening: > = 3 FB Dental:          Pulmonary:                              Cardiovascular:  Exercise tolerance: good (>4 METS),                     Neuro/Psych:               GI/Hepatic/Renal:             Endo/Other:                     Abdominal:   (+) obese,           Vascular: Other Findings:             Anesthesia Plan      general     ASA 2       Induction: intravenous. MIPS: Postoperative opioids intended, Prophylactic antiemetics administered and Postoperative trial extubation. Anesthetic plan and risks discussed with patient. Plan discussed with CRNA.                   Wilber Grider MD   6/30/2021

## 2021-06-30 NOTE — PROGRESS NOTES
Pt admitted to PACU via stretcher from the OR. Report received, assessment complete. VSS. Pt awake, alert and oriented, states that pain is a level 5 on 0-10 scale.

## 2021-06-30 NOTE — ANESTHESIA POSTPROCEDURE EVALUATION
Department of Anesthesiology  Postprocedure Note    Patient: Pawel Oneill  MRN: [id number]  YOB: 1990  Date of evaluation: 6/30/2021  Time:  2:51 PM     Procedure Summary     Date: 06/30/21 Room / Location: [address]    Anesthesia Start: 1410 Anesthesia Stop: 0467    Procedure: LEFT THUMB DEBRIDEMENT INCISION AND DRAINAGE (Left ) Diagnosis: (LEFT THUMB ABSCESS)    Surgeons: Corey Healy MD Responsible Provider: Jennie Candelario MD    Anesthesia Type: general ASA Status: 2          Anesthesia Type: general    Mai Phase I: Mai Score: 10    Mai Phase II:      Last vitals: Reviewed and per EMR flowsheets.        Anesthesia Post Evaluation    Patient location during evaluation: PACU  Patient participation: complete - patient participated  Level of consciousness: awake and alert  Pain score: 2  Airway patency: patent  Nausea & Vomiting: no vomiting  Complications: no  Cardiovascular status: blood pressure returned to baseline  Respiratory status: acceptable  Hydration status: euvolemic

## 2021-06-30 NOTE — PROGRESS NOTES
[facility] PROGRESS NOTE    6/30/2021 1:12 PM        Name: Stacy Victoria .              Admitted: 6/29/2021  Primary Care Provider: No primary care provider on file. (Tel: None)      Subjective:  . Admitted with left thumb abscess and cellulitis. Patient denies any drug use. He denies any injury. He works in the [employer]. He denies any pain in his hand. Reviewed interval ancillary notes    Current Medications  ketorolac (TORADOL) injection 30 mg, Q6H PRN  0.9 % sodium chloride infusion, Continuous  sodium chloride flush 0.9 % injection 5-40 mL, 2 times per day  sodium chloride flush 0.9 % injection 5-40 mL, PRN  0.9 % sodium chloride infusion, PRN  enoxaparin (LOVENOX) injection 40 mg, Nightly  ondansetron (ZOFRAN-ODT) disintegrating tablet 4 mg, Q8H PRN   Or  ondansetron (ZOFRAN) injection 4 mg, Q6H PRN  polyethylene glycol (GLYCOLAX) packet 17 g, Daily PRN  acetaminophen (TYLENOL) tablet 650 mg, Q6H PRN   Or  acetaminophen (TYLENOL) suppository 650 mg, Q6H PRN  potassium chloride (KLOR-CON M) extended release tablet 40 mEq, PRN   Or  potassium bicarb-citric acid (EFFER-K) effervescent tablet 40 mEq, PRN   Or  potassium chloride 10 mEq/100 mL IVPB (Peripheral Line), PRN  magnesium sulfate 2000 mg in 50 mL IVPB premix, PRN  clindamycin (CLEOCIN) 600 mg in dextrose 5 % 50 mL IVPB, Q8H        Objective:  BP (!) 142/85   Pulse 85   Temp 97.2 °F (36.2 °C) (Temporal)   Resp 16   Ht 6' 2\" (1.88 m)   Wt 250 lb (113.4 kg)   SpO2 98%   BMI 32.10 kg/m²   No intake or output data in the 24 hours ending 06/30/21 1312   Wt Readings from Last 3 Encounters:   06/30/21 250 lb (113.4 kg)       General appearance:  Appears comfortable  Eyes: Sclera clear. Pupils equal.  ENT: Moist oral mucosa. Trachea midline, no adenopathy. Cardiovascular: Regular rhythm, normal S1, S2. No murmur.  No edema in lower extremities  Respiratory: Not using accessory muscles. Good inspiratory effort. Clear to auscultation bilaterally, no wheeze or crackles. GI: Abdomen soft, no tenderness, not distended, normal bowel sounds  Musculoskeletal: No cyanosis in digits, neck supple  Neurology: CN 2-12 grossly intact. No speech or motor deficits  Psych: Normal affect. Alert and oriented in time, place and person  Skin: Cellulitis involving the left thumb and thenar eminence. Is purulent appearing blister on volar aspect of thumb    Labs and Tests:  CBC:   Recent Labs     06/29/21  1512   WBC 12.1*   HGB 14.0        BMP:    Recent Labs     06/29/21  1512      K 3.6      CO2 24   BUN 11   CREATININE 0.8*   GLUCOSE 107*     Hepatic:   Recent Labs     06/29/21  1512   AST 18   ALT 20   BILITOT 0.9   ALKPHOS 76     Problem List  Active Problems:    Cellulitis of hand  Resolved Problems:    * No resolved hospital problems. *       Assessment & Plan:   1. Left hand abscess-currently on clindamycin. Swab nares for MRSA. Add toradol. Going for I and D this afternoon.        Diet: Diet NPO  Code:Full Code  DVT PPX: lovenox      Klaudia Terrell PA-C   6/30/2021 1:12 PM

## 2021-06-30 NOTE — PROGRESS NOTES
Pt c/o pain level 5 on 0-10 scale, medicated with 2 norco po as ordered for pain control.  Pt tolerating orange juice and nishi crackers without c/o nausea or vomiting

## 2021-06-30 NOTE — OP NOTE
Operative Note      Patient: Melodie Mckeon  YOB: 1990  MRN: [id number]    Date of Procedure: 6/30/2021    Pre-Op Diagnosis: LEFT THUMB ABSCESS    Post-Op Diagnosis: Same       Procedure(s):  LEFT THUMB DEBRIDEMENT INCISION AND DRAINAGE    Surgeon(s):  Dread Carreon MD    Assistant:   First Assistant: Ree Cooney    Anesthesia: General    Estimated Blood Loss (mL): Minimal    Complications: None    Specimens:   ID Type Source Tests Collected by Time Destination   1 :  Tissue Tissue CULTURE, TISSUE (Canceled) Dread Carreon MD 6/30/2021 1428    2 :  Tissue Tissue CULTURE, TISSUE Dread Carreon MD 6/30/2021 1429    3 :  Tissue Tissue CULTURE, TISSUE Dread Carreon MD 6/30/2021 1433    4 :  Tissue Tissue CULTURE, CORRY Carreon MD 6/30/2021 1433        Implants:  * No implants in log *      Drains: * No LDAs found *    Findings: per dictation    Detailed Description of Procedure: The patient is a 53-year-old male who has developed progressive swelling, pain, and redness of the right thumb. Upon admission he did have elevated inflammatory markers and was febrile. He had no known injury prior to the onset of symptoms. On exam he did have pain with passive and active motion of the thumb. Sensation was intact and had brisk cap refill. There was no open wound. It was diffusely tender as well as erythematous and warm. I did recommend surgical I&D. Risk and benefits were thoroughly discussed. He did understand like proceed. Procedure  The patient was met in the preoperative holding area. Surgical site was identified marked. He was taken back to the operative room placed on table supine position. Prior to going back informed consent was obtained. In the operative room general esthesia was performed. The upper extremities prepped and draped using standard sterile technique. Formal timeout was performed. Correct surgical site, procedure, and patient was confirmed. Tourniquet was insufflated to 250 mmHg. We made an incision on the volar aspect of the thumb. Just proximal to the MCP joint and just distal to it. Following the initial skin incision purulent material was noted. Cultures were obtained x2. We dissected down to the flexor tendon. The flexor tendon was intact. We elevated the sheath and thoroughly irrigated the sheath as well as the subtenons region with sterile saline solution through the proximal and distal incisions. At that point the tourniquet was deflated. Hemostasis was achieved. We did place 2 interrupted 3-0 nylon sutures within the proximal incision however we left the central portion open as well as the distal incision. Packing tape was placed followed by 4 x 4's, [place]. Successfully taken to recovery room in excellent condition. Head in a procedure all counts are correct. Postoperative plan the patient will remain on IV antibiotics and follow-up cultures. Start soft tissue soaks tomorrow.     Electronically signed by Sara Mata MD on 6/30/2021 at 2:53 PM

## 2021-06-30 NOTE — PROGRESS NOTES
Assessment completed. Patient is alert and oriented x 4 and able to make all needs known. Remains NPO at this time. C/o pain to left thump 4/10 but declined any medication at this time. POC and education reviewed and mutually agreed upon. All needs met at this time. Will continue to monitor. Call light in reach. 1145 - Consent signed and placed in patient's chart. 4 PM - Patient returned from sx via stretcher and transferred self to bed sans any assistance. All needs met. C/o pain to left thumb and requested and given prn pain medication. All needs met at this time. Family at bedside. Will continue to monitor. Call light in reach.

## 2021-07-01 VITALS
HEART RATE: 60 BPM | WEIGHT: 250 LBS | SYSTOLIC BLOOD PRESSURE: 150 MMHG | TEMPERATURE: 98 F | BODY MASS INDEX: 32.08 KG/M2 | OXYGEN SATURATION: 97 % | DIASTOLIC BLOOD PRESSURE: 83 MMHG | RESPIRATION RATE: 18 BRPM | HEIGHT: 74 IN

## 2021-07-01 LAB
BASOPHILS ABSOLUTE: 0 K/UL (ref 0–0.2)
BASOPHILS RELATIVE PERCENT: 0.2 %
EOSINOPHILS ABSOLUTE: 0 K/UL (ref 0–0.6)
EOSINOPHILS RELATIVE PERCENT: 0.1 %
HCT VFR BLD CALC: 38.1 % (ref 40.5–52.5)
HEMOGLOBIN: 13.2 G/DL (ref 13.5–17.5)
LYMPHOCYTES ABSOLUTE: 0.9 K/UL (ref 1–5.1)
LYMPHOCYTES RELATIVE PERCENT: 8.1 %
MCH RBC QN AUTO: 30.5 PG (ref 26–34)
MCHC RBC AUTO-ENTMCNC: 34.6 G/DL (ref 31–36)
MCV RBC AUTO: 88.3 FL (ref 80–100)
MONOCYTES ABSOLUTE: 0.7 K/UL (ref 0–1.3)
MONOCYTES RELATIVE PERCENT: 6.3 %
NEUTROPHILS ABSOLUTE: 9.7 K/UL (ref 1.7–7.7)
NEUTROPHILS RELATIVE PERCENT: 85.3 %
PDW BLD-RTO: 12.9 % (ref 12.4–15.4)
PLATELET # BLD: 234 K/UL (ref 135–450)
PMV BLD AUTO: 8.5 FL (ref 5–10.5)
RBC # BLD: 4.31 M/UL (ref 4.2–5.9)
WBC # BLD: 11.3 K/UL (ref 4–11)

## 2021-07-01 PROCEDURE — 6370000000 HC RX 637 (ALT 250 FOR IP): Performed by: PHYSICIAN ASSISTANT

## 2021-07-01 PROCEDURE — 85025 COMPLETE CBC W/AUTO DIFF WBC: CPT

## 2021-07-01 PROCEDURE — 99024 POSTOP FOLLOW-UP VISIT: CPT | Performed by: NURSE PRACTITIONER

## 2021-07-01 PROCEDURE — 6360000002 HC RX W HCPCS: Performed by: ORTHOPAEDIC SURGERY

## 2021-07-01 PROCEDURE — 2500000003 HC RX 250 WO HCPCS: Performed by: ORTHOPAEDIC SURGERY

## 2021-07-01 PROCEDURE — 36415 COLL VENOUS BLD VENIPUNCTURE: CPT

## 2021-07-01 PROCEDURE — APPNB45 APP NON BILLABLE 31-45 MINUTES: Performed by: NURSE PRACTITIONER

## 2021-07-01 PROCEDURE — 2580000003 HC RX 258: Performed by: ORTHOPAEDIC SURGERY

## 2021-07-01 RX ORDER — HYDROCODONE BITARTRATE AND ACETAMINOPHEN 5; 325 MG/1; MG/1
1 TABLET ORAL EVERY 6 HOURS PRN
Qty: 12 TABLET | Refills: 0 | Status: SHIPPED | OUTPATIENT
Start: 2021-07-01 | End: 2021-07-04

## 2021-07-01 RX ORDER — LINEZOLID 600 MG/1
600 TABLET, FILM COATED ORAL EVERY 12 HOURS SCHEDULED
Status: DISCONTINUED | OUTPATIENT
Start: 2021-07-01 | End: 2021-07-01 | Stop reason: HOSPADM

## 2021-07-01 RX ORDER — LINEZOLID 600 MG/1
600 TABLET, FILM COATED ORAL ONCE
Status: COMPLETED | OUTPATIENT
Start: 2021-07-01 | End: 2021-07-01

## 2021-07-01 RX ORDER — LINEZOLID 600 MG/1
600 TABLET, FILM COATED ORAL 2 TIMES DAILY
Qty: 14 TABLET | Refills: 0 | Status: SHIPPED | OUTPATIENT
Start: 2021-07-01 | End: 2021-07-08

## 2021-07-01 RX ORDER — CLINDAMYCIN HYDROCHLORIDE 300 MG/1
300 CAPSULE ORAL 4 TIMES DAILY
Qty: 28 CAPSULE | Refills: 0 | Status: SHIPPED
Start: 2021-07-01 | End: 2021-07-01 | Stop reason: HOSPADM

## 2021-07-01 RX ADMIN — CLINDAMYCIN PHOSPHATE 600 MG: 600 INJECTION, SOLUTION INTRAVENOUS at 07:54

## 2021-07-01 RX ADMIN — KETOROLAC TROMETHAMINE 30 MG: 30 INJECTION, SOLUTION INTRAMUSCULAR at 03:00

## 2021-07-01 RX ADMIN — Medication 10 ML: at 07:54

## 2021-07-01 RX ADMIN — LINEZOLID 600 MG: 600 TABLET, FILM COATED ORAL at 13:17

## 2021-07-01 RX ADMIN — KETOROLAC TROMETHAMINE 30 MG: 30 INJECTION, SOLUTION INTRAMUSCULAR at 07:50

## 2021-07-01 ASSESSMENT — PAIN DESCRIPTION - PROGRESSION: CLINICAL_PROGRESSION: NOT CHANGED

## 2021-07-01 ASSESSMENT — PAIN SCALES - GENERAL
PAINLEVEL_OUTOF10: 0
PAINLEVEL_OUTOF10: 8

## 2021-07-01 ASSESSMENT — PAIN DESCRIPTION - ONSET: ONSET: ON-GOING

## 2021-07-01 ASSESSMENT — PAIN DESCRIPTION - PAIN TYPE: TYPE: SURGICAL PAIN

## 2021-07-01 ASSESSMENT — PAIN DESCRIPTION - DESCRIPTORS: DESCRIPTORS: SORE;THROBBING

## 2021-07-01 ASSESSMENT — PAIN DESCRIPTION - LOCATION: LOCATION: FINGER (COMMENT WHICH ONE)

## 2021-07-01 ASSESSMENT — PAIN DESCRIPTION - FREQUENCY: FREQUENCY: CONTINUOUS

## 2021-07-01 ASSESSMENT — PAIN DESCRIPTION - ORIENTATION: ORIENTATION: LEFT

## 2021-07-01 NOTE — CARE COORDINATION
Discharge Planning Assessment  Rn/SW discharge planner met w/patient/family member to discuss reason for admission, current living situation, and potential needs at the time of discharge. Demographics/Insurance verified Yes    Current type of dwellin level home    Living arrangements: alone    Level of function/support:independent w/all ADL's    PCP:none    DME: none    Active with any community resources/agencies/skilled home care: n/a    Medication compliance issues: no ins    Financial issues that could impact healthcare: no ins    Transportation at time of d/c (Discussed w/pt/family that on the day of discharge home, tentative time of discharge will be between 10am and noon): friend/family    Discussed and provided facilities of choice if transition to a skilled nursing facility is required a the time of discharge-N/A. Tentative discharge plan: Met w/pt to determine any dc needs. Pt stated he had no PCP at this time-provided pt w/[facility] info. Pt will need meds to beds on dc. Following for any needs regarding wound care.     Electronically signed by JACKY Li  [phone]

## 2021-07-01 NOTE — PROGRESS NOTES
CLINICAL PHARMACY NOTE: MEDS TO BEDS    Total # of Prescriptions Filled: 2   The following medications were delivered to the patient:  · Norco 5-325 mg  · Zyvox 600 mg    Additional Documentation:  Delivered to Patient-signed   Jie Peraza CPhT

## 2021-07-01 NOTE — PROGRESS NOTES
[address] [facility] Orthopedic Surgery   Progress Note    CHIEF COMPLAINT/DIAGNOSIS: S/p LEFT thumb I&D (6/30/21)    SUBJECTIVE: The patient is sitting up in bed; describes mild thumb pain. No new issues since surgery. Remains afebrile. OBJECTIVE  Physical    VITALS:  BP (!) 150/83   Pulse 60   Temp 98 °F (36.7 °C) (Oral)   Resp 18   Ht 6' 2\" (1.88 m)   Wt 250 lb (113.4 kg)   SpO2 97%   BMI 32.10 kg/m²     GENERAL: Alert and oriented x3, in no acute distress. MUSCULOSKELETAL: Able to flex and extend the operative thumb with mild ongoing limited flexion due to swelling. INCISION:  Covered with post-op dressing; clean, dry and intact. Dressing removed and packing removed. Two stiches in distal incision. Proximal incision remains open. No purulent material noted. Minimal bloody drainage. Soaked for 15 mins; new nonadherent dressing placed. Sensory:  Intact to light touch throughout left hand/thumb. Vascular:   2+ radial pulses with brisk cap refill in all digits. Data    ALL MEDICATIONS HAVE BEEN REVIEWED    CBC:   Recent Labs     06/29/21  1512 07/01/21  0539   WBC 12.1* 11.3*   HGB 14.0 13.2*   HCT 40.5 38.1*    234     BMP:   Recent Labs     06/29/21  1512      K 3.6      CO2 24   BUN 11   CREATININE 0.8*     INR: No results for input(s): INR in the last 72 hours. Surgical culture #1 - MRSA  Surgical culture #2 -2+ Epithelial cells and 1+ WBCs, MRSA    ASSESSMENT:  S/p LEFT thumb I&D (6/30/21), POD#1  Tobacco abuse    PLAN:   - Continue daily warm water soaks followed by dry dressing. Dressing supplies provided. - DVT prophylaxis: lovenox as inpt per primary team.   - Pain Control: toradol   - ID:  Zyvox for d/c  - Disposition: home with oral abx once medical team agrees    Follow-up with Dr. Trevor Murillo in appox 10 days. Office #[phone]  No future appointments.     Samir Stanton, APRN - CNP  7/1/2021  9:08 AM

## 2021-07-01 NOTE — DISCHARGE SUMMARY
[facility]    [facility] Demographics    Patient. Homer Sandhoff  Date of Birth. 1990  MRN. [id number]     Primary care provider. No primary care provider on file. (Tel: None)    Admit date: 6/29/2021    Discharge date (blank if same as Note Date): Note Date: 7/1/2021     Reason for Hospitalization. Chief Complaint   Patient presents with    Cellulitis     Pt with cellulitis to left thumb, states he was bit by something on Saturday, noticed swelling, warmth, and redness on Sunday. Significant Findings. Active Problems:    Cellulitis/abscess R hand       Problems and results from this hospitalization that need follow up. 1. Post op follow up     Significant test results and incidental findings. 1. Cultures positive for MRSA    Invasive procedures and treatments. 1. I and [address]. Patient is a 81-year-old male who presented with pain and redness of his left thumb. He did not recall any trauma. He does not use any drugs. His exam was consistent with cellulitis and abscess. He was admitted started on broad-spectrum antibiotics. He was seen by orthopedic surgery and underwent an I&D on July 30. Tolerated the procedure well. His culture positive for MRSA. Sensitivities are pending at time of discharge. He was discharged home with 1 week of Zyvox. He will follow-up with orthopedic surgery in 10 to 14 days. Consults. IP CONSULT TO ORTHOPEDIC SURGERY  IP CONSULT TO HOSPITALIST  IP CONSULT TO ORTHOPEDIC SURGERY    Physical examination on discharge day. BP (!) 150/83   Pulse 60   Temp 98 °F (36.7 °C) (Oral)   Resp 18   Ht 6' 2\" (1.88 m)   Wt 250 lb (113.4 kg)   SpO2 97%   BMI 32.10 kg/m²   General appearance. Alert. Looks comfortable. HEENT. Sclera clear. Moist mucus membranes. Cardiovascular.  Regular rate and rhythm, normal S1, S2. No murmur. Respiratory. Not using accessory muscles. Clear to auscultation bilaterally, no wheeze. Gastrointestinal. Abdomen soft, non-tender, not distended, normal bowel sounds  Neurology. Facial symmetry. No speech deficits. Moving all extremities equally. Extremities. No edema in lower extremities. Skin. Dressing R hand    Condition at time of discharge stable    Medication instructions provided to patient at discharge. Medication List      START taking these medications    HYDROcodone-acetaminophen 5-325 MG per tablet  Commonly known as: Norco  Take 1 tablet by mouth every 6 hours as needed for Pain for up to 3 days. Intended supply: 3 days. Take lowest dose possible to manage pain     linezolid 600 MG tablet  Commonly known as: Zyvox  Take 1 tablet by mouth 2 times daily for 7 days           Where to Get Your Medications      These medications were sent to [facility], [address] [place] [address], [address] [place] [address] Road    Phone: [phone]   · HYDROcodone-acetaminophen 5-325 MG per tablet  · linezolid 600 MG tablet         Discharge recommendations given to patient. Follow Up. Ortho in 10-14 days   Disposition. home  Activity. activity as tolerated  Diet: ADULT DIET; Regular      Spent 32 minutes in discharge process. Signed:   Cee Hutson PA-C     7/1/2021 2:11 PM

## 2021-07-01 NOTE — PROGRESS NOTES
Shift assessment completed. VSS. Patient alert and oriented x 4. Dressing to left thumb clean, dry and intact. Denies having any pain or discomfort at this time. Medications given per MAR. Patient independent in room. The care plan and education have been reviewed and mutually agreed upon with the patient. Call light in reach. Will continue to monitor.

## 2021-07-01 NOTE — PROGRESS NOTES
PM assessment completed. VSS. Dressing to left thumb remains CDI. No needs voiced. Patient independent in room. Fall precautions in place, hourly rounding, call light and belongings in reach, bed in lowest position, wheels locked in place, side rails up x 2, walkways free of clutter.

## 2021-07-01 NOTE — PROGRESS NOTES
Patient provided with discharge instructions, discussed in detail, new medications reviewed including use and side effects. Patient verbalized understanding. Prescriptions filled per outpatient pharmacy. All questions answered, patient waiting for pharmacy to deliver prescriptions. 1419 Prescriptions delivered to patient's room. Patient discharged at this time.

## 2021-07-01 NOTE — PLAN OF CARE
Problem: Discharge Planning:  Goal: Discharged to appropriate level of care  Description: Discharged to appropriate level of care  7/1/2021 0805 by Rajni Spencer RN  Outcome: Ongoing  6/30/2021 2303 by Anabella Schwarz RN  Outcome: Ongoing  6/30/2021 2303 by Anabella Schwarz RN  Outcome: Ongoing     Problem:  Body Temperature - Imbalanced:  Goal: Ability to maintain a body temperature in the normal range will improve  Description: Ability to maintain a body temperature in the normal range will improve  7/1/2021 0805 by Rajni Spencer RN  Outcome: Ongoing  6/30/2021 2303 by Anabella Schwarz RN  Outcome: Ongoing  6/30/2021 2303 by Anabella Schwarz RN  Outcome: Ongoing     Problem: Mobility - Impaired:  Goal: Mobility will improve to maximum level  Description: Mobility will improve to maximum level  7/1/2021 0805 by Rajni Spencer RN  Outcome: Ongoing  6/30/2021 2303 by Anabella Schwarz RN  Outcome: Ongoing  6/30/2021 2303 by Anabella Schwarz RN  Outcome: Ongoing     Problem: Pain:  Goal: Pain level will decrease  Description: Pain level will decrease  7/1/2021 0805 by Rajni Spencer RN  Outcome: Ongoing  6/30/2021 2303 by Anabella Schwarz RN  Outcome: Ongoing  6/30/2021 2303 by Anabella Schwarz RN  Outcome: Ongoing  Goal: Control of acute pain  Description: Control of acute pain  7/1/2021 0805 by Rajni Spencer RN  Outcome: Ongoing  6/30/2021 2303 by Anabella Schwarz RN  Outcome: Ongoing  6/30/2021 2303 by Anabella Schwarz RN  Outcome: Ongoing  Goal: Control of chronic pain  Description: Control of chronic pain  7/1/2021 0805 by Rajni Spencer RN  Outcome: Ongoing  6/30/2021 2303 by Anabella Schwarz RN  Outcome: Ongoing  6/30/2021 2303 by Anabella Schwarz RN  Outcome: Ongoing     Problem: Skin Integrity - Impaired:  Goal: Will show no infection signs and symptoms  Description: Will show no infection signs and symptoms  7/1/2021 0805 by Rajni Spencer RN  Outcome: Ongoing  6/30/2021 2303 by Anabella Schwarz RN  Outcome: Ongoing  6/30/2021 2303 by Anabella Schwarz, RN  Outcome: Ongoing  Goal: Absence of new skin breakdown  Description: Absence of new skin breakdown  7/1/2021 0805 by Zonia Mendoza RN  Outcome: Ongoing  6/30/2021 2303 by Juan Banda RN  Outcome: Ongoing  6/30/2021 2303 by Juan Banda RN  Outcome: Ongoing     Problem: Pain:  Goal: Pain level will decrease  Description: Pain level will decrease  7/1/2021 0805 by Zonia Mendoza RN  Outcome: Ongoing  6/30/2021 2303 by Juan Banda RN  Outcome: Ongoing  6/30/2021 2303 by Juan Banda RN  Outcome: Ongoing  Goal: Control of acute pain  Description: Control of acute pain  7/1/2021 0805 by Zonia Mendoza RN  Outcome: Ongoing  6/30/2021 2303 by Juan Banda RN  Outcome: Ongoing  6/30/2021 2303 by Juan Banda RN  Outcome: Ongoing  Goal: Control of chronic pain  Description: Control of chronic pain  7/1/2021 0805 by Zonia Mendoza RN  Outcome: Ongoing  6/30/2021 2303 by Juan Banda RN  Outcome: Ongoing  6/30/2021 2303 by Juan Banda RN  Outcome: Ongoing

## 2021-07-01 NOTE — PLAN OF CARE
Stephane Ivy RN  Outcome: Ongoing  Goal: Control of acute pain  Description: Control of acute pain  6/30/2021 2303 by Anne Canales RN  Outcome: Ongoing  6/30/2021 0914 by Stephane Ivy RN  Outcome: Ongoing  Goal: Control of chronic pain  Description: Control of chronic pain  6/30/2021 2303 by Anne Canales RN  Outcome: Ongoing  6/30/2021 0914 by Stephane Ivy RN  Outcome: Ongoing

## 2021-07-01 NOTE — PLAN OF CARE
Problem: Discharge Planning:  Goal: Discharged to appropriate level of care  Description: Discharged to appropriate level of care  6/30/2021 2303 by John Benavides RN  Outcome: Ongoing  6/30/2021 2303 by John Benavides RN  Outcome: Ongoing  6/30/2021 0914 by Carmita Almanzar RN  Outcome: Ongoing     Problem:  Body Temperature - Imbalanced:  Goal: Ability to maintain a body temperature in the normal range will improve  Description: Ability to maintain a body temperature in the normal range will improve  6/30/2021 2303 by John Benavides RN  Outcome: Ongoing  6/30/2021 2303 by John Benavides RN  Outcome: Ongoing  6/30/2021 0914 by Carmita Almanzar RN  Outcome: Ongoing     Problem: Mobility - Impaired:  Goal: Mobility will improve to maximum level  Description: Mobility will improve to maximum level  6/30/2021 2303 by John Benavides RN  Outcome: Ongoing  6/30/2021 2303 by John Benavides RN  Outcome: Ongoing  6/30/2021 0914 by Carmita Almanzar RN  Outcome: Ongoing     Problem: Pain:  Goal: Pain level will decrease  Description: Pain level will decrease  6/30/2021 2303 by John Benavides RN  Outcome: Ongoing  6/30/2021 2303 by John Benavides RN  Outcome: Ongoing  6/30/2021 0914 by Carmita Almanzar RN  Outcome: Ongoing  Goal: Control of acute pain  Description: Control of acute pain  6/30/2021 2303 by John Benavides RN  Outcome: Ongoing  6/30/2021 2303 by John Benavides RN  Outcome: Ongoing  6/30/2021 0914 by Carmita Almanzar RN  Outcome: Ongoing  Goal: Control of chronic pain  Description: Control of chronic pain  6/30/2021 2303 by John Benavides RN  Outcome: Ongoing  6/30/2021 2303 by John Benavides RN  Outcome: Ongoing  6/30/2021 0914 by Carmita Almanzar RN  Outcome: Ongoing     Problem: Skin Integrity - Impaired:  Goal: Will show no infection signs and symptoms  Description: Will show no infection signs and symptoms  6/30/2021 2303 by John Benavides RN  Outcome: Ongoing  6/30/2021 2303 by John Benavides RN  Outcome: Ongoing  6/30/2021 0914 by Carmita Almanzar RN  Outcome: Ongoing  Goal: Absence of new skin breakdown  Description: Absence of new skin breakdown  6/30/2021 2303 by Janice Thayer RN  Outcome: Ongoing  6/30/2021 2303 by Janice Thayer RN  Outcome: Ongoing  6/30/2021 0914 by Candace Yoder RN  Outcome: Ongoing     Problem: Pain:  Goal: Pain level will decrease  Description: Pain level will decrease  6/30/2021 2303 by Janice Thayer RN  Outcome: Ongoing  6/30/2021 2303 by Janice Thayer RN  Outcome: Ongoing  6/30/2021 0914 by Candace Yoder RN  Outcome: Ongoing  Goal: Control of acute pain  Description: Control of acute pain  6/30/2021 2303 by Janice Thayer RN  Outcome: Ongoing  6/30/2021 2303 by Janice Thayer RN  Outcome: Ongoing  6/30/2021 0914 by Candace Yoder RN  Outcome: Ongoing  Goal: Control of chronic pain  Description: Control of chronic pain  6/30/2021 2303 by Janice Thayer RN  Outcome: Ongoing  6/30/2021 2303 by Janice Thayer RN  Outcome: Ongoing  6/30/2021 0914 by Candace Yoder RN  Outcome: Ongoing

## 2021-07-05 LAB
ANAEROBIC CULTURE: ABNORMAL
ANAEROBIC CULTURE: ABNORMAL
CULTURE SURGICAL: ABNORMAL
CULTURE SURGICAL: ABNORMAL
GRAM STAIN RESULT: ABNORMAL
GRAM STAIN RESULT: ABNORMAL
ORGANISM: ABNORMAL
ORGANISM: ABNORMAL

## 2021-07-08 ENCOUNTER — OFFICE VISIT (OUTPATIENT)
Dept: ORTHOPEDIC SURGERY | Age: 31
End: 2021-07-08

## 2021-07-08 VITALS — BODY MASS INDEX: 32.08 KG/M2 | HEIGHT: 74 IN | WEIGHT: 250 LBS

## 2021-07-08 DIAGNOSIS — L03.119 CELLULITIS OF HAND: Primary | ICD-10-CM

## 2021-07-08 PROCEDURE — 99024 POSTOP FOLLOW-UP VISIT: CPT | Performed by: ORTHOPAEDIC SURGERY

## 2021-07-12 NOTE — PROGRESS NOTES
7/8/2021     Interval History:  Status post I&D of left thumb on 6/30/2021    Patient overall is doing well. He is soaking his thumb as instructed daily and dressing it with a dry dressing. He has recently completed his course of antibiotics. No fever or chills. Medical History:  Patient's medications, allergies, past medical, surgical, social, and family histories were reviewed and updated as appropriate. Objective:  Ht 6' 2\" (1.88 m)   Wt 250 lb (113.4 kg)   BMI 32.10 kg/m²      Physical Exam:  Left thumb demonstrates the incisions are healing, intact motor and sensory function of the thumb, no active infection, brisk cap refill    Assessment:  Status post I&D of left thumb on 6/30/2021    Plan:  The patient is doing well. Continue soaks as instructed. Return to see me 1 week. Electronically signed by Uziel Osuna MD on 7/12/21 at 10:57 AM EDT     Disclaimer: This note was dictated with voice recognition software. Though review and correction are routine, we apologize for any errors.

## 2021-07-15 ENCOUNTER — OFFICE VISIT (OUTPATIENT)
Dept: ORTHOPEDIC SURGERY | Age: 31
End: 2021-07-15

## 2021-07-15 VITALS — HEIGHT: 74 IN | BODY MASS INDEX: 32.08 KG/M2 | WEIGHT: 250 LBS

## 2021-07-15 DIAGNOSIS — L03.119 CELLULITIS OF HAND: Primary | ICD-10-CM

## 2021-07-15 PROCEDURE — 99024 POSTOP FOLLOW-UP VISIT: CPT | Performed by: ORTHOPAEDIC SURGERY

## 2021-07-19 NOTE — PROGRESS NOTES
7/15/2021     Interval History:  Status post I&D of left thumb on 6/30/2021    Patient overall is doing well. He is soaking his thumb as instructed daily and dressing it with a dry dressing. He has recently completed his course of antibiotics. No fever or chills. Medical History:  Patient's medications, allergies, past medical, surgical, social, and family histories were reviewed and updated as appropriate. Objective:  Ht 6' 2\" (1.88 m)   Wt 250 lb (113.4 kg)   BMI 32.10 kg/m²      Physical Exam:  Left thumb demonstrates the incisions are healing, intact motor and sensory function of the thumb, no active infection, brisk cap refill    Assessment:  Status post I&D of left thumb on 6/30/2021    Plan:  The patient is doing well. Sutures were removed today. He will start occupational therapy. Return to see me in 2 weeks for wound check. Disclaimer: This note was dictated with voice recognition software. Though review and correction are routine, we apologize for any errors.

## 2021-07-20 ENCOUNTER — HOSPITAL ENCOUNTER (OUTPATIENT)
Dept: OCCUPATIONAL THERAPY | Age: 31
Setting detail: THERAPIES SERIES
Discharge: HOME | End: 2021-07-20

## 2021-07-20 PROCEDURE — 97022 WHIRLPOOL THERAPY: CPT

## 2021-07-20 PROCEDURE — 97110 THERAPEUTIC EXERCISES: CPT

## 2021-07-20 PROCEDURE — 97140 MANUAL THERAPY 1/> REGIONS: CPT

## 2021-07-20 PROCEDURE — 97165 OT EVAL LOW COMPLEX 30 MIN: CPT

## 2021-07-20 NOTE — PLAN OF CARE
[address], [address] [facility] [address]  Phone: [phone]   Fax: [phone]                                                     Occupational Therapy Certification    Dear  Dr. Nerissa Arora  ,    We had the pleasure of evaluating the following patient for occupational therapy services at [facility]. A summary of our findings can be found in the initial assessment below. This includes our plan of care. If you have any questions or concerns regarding these findings, please do not hesitate to contact me at the office phone number checked above. Thank you for the referral.       Referring Practitioner Signature:_______________________________Date:__________________  By signing above (or electronic signature), therapists plan is approved by the referring practicioner      Patient: Zina Floyd   : 1990   MRN: [id number]  Referring Practicioner:Mariela       Evaluation Date: 2021      Medical Diagnosis Information:   Status post I&D of left thumb on 2021                                                   Insurance information: self pay       Precautions/ Contra-indications:    Latex Allergy:  []NO      []YES  Preferred Language for Healthcare:   []English       []Other:    C-SSRS Triggered by Intake questionnaire (Past 2 wk assessment ):   [x] No, Questionnaire did not trigger screening.   [] Yes, Patient intake triggered C-SSRS Screening     [] Completed, no further action required. [] Completed, PCP notified via [place]  Reason for Seeking OT Services and Patient Goals:  Tightness in left thumb    Personal Interests and Values:  Likes x box games but very limited     Occupational History (Life Experiences Including Prior Level of Function):   Works as a manager at [employer] (Routines, Roles, Rituals, & Habits):    Physical and Social Environments (which aide or inhibit performance in desired occupations):  Lives with friends     Personal, Temporal, and Virtual Contexts (which aide or inhibit performance in desired occupations): hand pain is interfereing with driving      SUBJECTIVE: Patient stated complaint: tightness in left thumb    Pain Scale 0 /10  Easing factors: tries to keep his hand moving   Provocative factors: bumping into something    Type: []Constant   [x]Intermittent  []Radiating []Localized []other:       OBJECTIVE:   Hand dominance:  Right     Inspection  Noted radial aspect of hand is dry and skin is dry and red     Palpation:  Scar at mcp of thumb    Bandages/Dressings/Incisions: na    ROM WFL: []Yes []No (if checked, see below)     PROM AROM    L R L R   Shoulder Flexion    wnl    Shoulder Abduction        Shoulder External Rotation        Shoulder Internal Rotation        Elbow Flexion        Elbow Extension        Pronation        Supination        Wrist Flexion        Wrist Extension        Radial Deviation        Ulnar Deviation       CMC Abduction       5th Digit MCP Extension-Flexion       4th Digit MCP Extension-Flexion       3rd Digit MCP Extension-Flexion       2nd Digit MCP Extension-Flexion       1st Digit MCP Extension-Flexion 45      5th Digit PIP Extension- Flexion       4th Digit PIP Extension-Flexion       3rd Digit PIP Extension-Flexion       2nd Digit PIP Extension-Flexion       1st Digit IP Extension-Flexion 25 degrees      5th Digit DIP Extension-Flexion       4th Digit DIP Extension-Flexion       3rd Digit DIP Extension-Flexion       2nd Digit DIP Extension-Flexion       Composite Flexion (Tip of 3rd Digit to Distal Palmar Crease (cm))         Joint mobility:    [x]Normal    []Hypo   []Hyper    Strength WFL: []Yes []No (if checked, see below)    Strength (0-5) Left Right    Shoulder Flex     Shoulder Abd     Shoulder ER     Shoulder IR     Biceps     Triceps     Pronation      Supination      Wrist Flex     Wrist Ext     Wrist Endocrine conditions   []Hypothyroid (E03.9)  []Hyperthyroid Gastrointestinal conditions   []Constipation (B76.62)   Metabolic conditions   []Morbid obesity (E66.01)  []Diabetes type 1(E10.65) or 2 (E11.65)   []Neuropathy (G60.9)     Cardio/Pulmonary conditions   []Asthma (J45)  []Coughing   []COPD (J44.9)  []CHF  []A-fib   Psychological Disorders  []Anxiety (F41.9)  []Depression (F32.9)   []Other:   Developmental Disorders  []Autism (F84.0)  []CP (G80)  []Down Syndrome (Q90.9)  []Developmental delay     Neurological conditions  []Prior Stroke (I69.30)  []Parkinson's (G20)  []Encephalopathy (G93.40)  []MS (G35)  []Post-polio (G14)  []SCI  []TBI  []ALS Other conditions  []Fibromyalgia (M79.7)  []Vertigo  []Syncope  []Kidney Failure  []Cancer      []currently undergoing                treatment  []Pregnancy  []Incontinence   Prior surgeries  []involved limb  []previous spinal surgery  [] section birth  []hysterectomy  []bowel / bladder surgery  []other relevant surgeries   []Other:              Barriers to/and or personal factors that will affect rehab potential:              []Age  []Sex    [x]Smoker              []Motivation/Lack of Motivation                        []Co-Morbidities              []Cognitive Function, education/learning barriers              []Environmental, home barriers              []profession/work barriers  []past PT/medical experience  []other:  Justification:  Patient demonstrates decreased ability to complete vocational and avocational pursuits due to decreased left hand strength and rom   Falls Risk Assessment (30 days):    [x] Falls risk assessed and no intervention required.   [] Falls risk assessed (via clinical reasoning) and patient requires intervention due to being higher risk for falls  [] Falls education provided, including       ASSESSMENT: The pt has chief complaints of  Decreased ability to grasp objects, and decreased strength  These symptoms as well as client factor and performance skill deficits create barriers to the patient engaging in desired occupational pursuits. Therefore, the patient is in need of skilled occupational therapy services to mitigate functional deficits in order to allow the patient to return to baseline, prevent further decline, and/or compensate for decreased functional abilities.       Functional Impairments and Activity Limitations (from functional questionnaire, intake, and interview)    []Reduced participation in functional mobility   []Reduced cognition   [x]Reduced participation in high level IADLs   [x]Reduced participation ADLs   []Reduced endurance  [x]Reduced fine motor control   [x]Reduced ROM   []Reduced sensation   []Reduced participation ADLs   [x]Reduced coordination  []Reduced strength   []Reduced balance   []Reduced posture   []Reduced safety awareness   []Reduced functional vision      Participation Restrictions   none   Prognosis/Rehab Potential:      []Excellent   [x]Good    []Fair   []Poor    Tolerance of evaluation/treatment:    []Excellent   [x]Good    []Fair   []Poor    Occupational Therapy Evaluation Complexity Justification   [x] A Occupational Profile/Medical and Therapy History  [] no personal factors and/or comorbidities that impact the plan of care; brief history relating to presenting problem  [x]1-2 personal factors and/or comorbidities that impact the plan of care; additional review of physical, cognitive, or psychosocial performance  []3 personal factors and/or comorbidities that impact the plan of care; extensive review of physical cognitive, psychosocial performance  [x] Patient Assessment:  [x] a total of 1-3 performance deficits relating to physical, cognitive, psychosocial limitations/restrictions  [] a total of 3-5 performance deficits relating to physical, cognitive, psychosocial limitations/restrictions  [] a total of 5 or more performance deficits relating to physical, cognitive, psychosocial limitations/restrictions  [x] A clinical presentation with:  [x] low complexity, limited amount of treatment options no assessment modification, no co-morbidities  [] moderate analytical complexity, detailed assessments, minimal to moderate modification of assessments, may have co-morbidities  [] high analytic complexity, comprehensive assessments, multiple treatment options, significant modifications of assessment, co-morbidities affecting performance  [x] Clinical decision making of [x] low , [] moderate, [] high complexity using standardized patient assessment instrument and/or measurable assessment of functional outcome. [x] EVAL (LOW) 02731 (typically 15 minutes face-to-face)  [] EVAL (MOD) 76443 (typically 30 minutes face-to-face)  [] EVAL (HIGH) 16224 (typically 45 minutes face-to-face)  [] RE-EVAL 01720    PLAN:  Frequency/Duration:  1days per week for 4 Weeks:  INTERVENTIONS:  [x] Strength training, ROM, balance training, functional mobility training  [] Neuromuscular re-education and positioning  [x] Manual therapy including soft tissue mobilization and joint manipululations  [x] Modalities as needed that may include: E-stim, Ultrasound, Fluidotherapy, Iontophoresis as indicated, Paraffin, Hot Packs, Cold Packs  [x] Patient/caregiver education on joint protection, postural re-education, activity modification, progression of HEP. [] Patient/caregiver education regarding home management and safety as well as ADL and IADL performance  [] Cognitive re-orientation and training  [x] Manual therapy including soft tissue mobilization, manual lymph drainage, and joint manipululations  [] Adaptive Equipment Education and Training  [x] Splinting including custom or pre-fabricated    HEP instruction: Written and verbal HEP instructions provided and reviewed:    Access Code: [email or web address]   URL: [email or web address]   Date: 07/20/2021   Prepared by: Doris Gill      Exercises   Thumb AROM Opposition To All Fingers - 1 x daily - 7 x weekly - 3 sets - 10 reps   Seated Thumb Composite Flexion AROM - 1 x daily - 7 x weekly - 3 sets - 10 reps   Thumb Abduction AROM on Table - 1 x daily - 7 x weekly - 3 sets - 10 reps   Thumb AROM MP Blocking - 1 x daily - 7 x weekly - 3 sets - 10 reps   Thumb AROM IP Blocking - 1 x daily - 7 x weekly - 3 sets - 10 reps      Patient also educated to complete desensitization with gentle scar tissue massage circles and js ,  Along with retrograde massage for edema. Patient demonstrated excellent understanding of both. Patient educated in use of coban wrap just to provide light protection of thumb at work. Demonstrated and verbalized understanding of not wrapping thumb too tight. GOALS:  Patient stated goal: increase strength and rom of left hand to be able to complete all vocation and avocational pursuits especially driving  [] Progressing: [] Met: [] Not Met: [] Adjusted    Therapist goals for Patient:   Short Term Goals: To be achieved in: 30 days  1. Pt will increase three point pinch by 3 pounds in right hand to be able to play x box for up to 30 minutes without difficulty  [] Progressing: [] Met: [] Not Met: [] Adjusted  2. Pt will have increased  strength in left hand to 40 pounds to be able to reach and grasp cylindrical objects up to 5 pounds without difficulty  [] Progressing: [] Met: [] Not Met: [] Adjusted    Long Term Goals: To be achieved by della  1. Pt will will report a QuickDASH Symptom Severity Scale  12 or less indicating increased safety and functional independence in desired occupational pursuits by discharge.   [] Progressing: [] Met: [] Not Met: [] Adjusted  Electronically signed by:  Rocael Prince OT

## 2021-07-20 NOTE — FLOWSHEET NOTE
[facility] - Outpatient Occupational Therapy      Hand Therapy Daily Treatment Note  Date:  2021    Patient: Alaiyah Gill   : 1990   MRN: [id number]  Referring Physician:        Medical Diagnosis Information: Status post I&D of left thumb on 2021                                                  Insurance information:  self pay  Date of Injury: 2021 probable insect bite   Date of Surgery: 2021    Progress Report: []  Yes  [x]  No     Date Range for reporting period:  Beginnin2021  Endin2021    Progress report due (10 Rx/or 30 days whichever is less): visit #10 or     Recertification due (POC duration/ or 90 days whichever is less): visit #10 or 10/2021    Visit # Insurance Allowable Auth required? Date Range    Self pay []  Yes  [x]  No         Units approved Units used Date Range             Latex Allergy:  []No      []Yes  Pacemaker:  [] No       [] Yes     Preferred Language for Healthcare:   [x]English       []other:    Pain level: 2/10     SUBJECTIVE:  See rocioal    Functional Disability Index: Quick DASH: raw score = 20    OBJECTIVE:  Session initiated with assessment of pt status using subjective and objective measures noted in evaluation, followed by collaborative discussion regarding goals. Pt educated in HEP below, demonstrating correct form and verbalizing understanding of completion. HEP instruction: Written and verbal HEP instructions provided and reviewed:  ·  Access Code: [email or web address]  · URL: [email or web address]  · Date: 2021  · Prepared by: Cole Killer  ·    · Exercises  · Thumb AROM Opposition To All Fingers - 1 x daily - 7 x weekly - 3 sets - 10 reps  · Seated Thumb Composite Flexion AROM - 1 x daily - 7 x weekly - 3 sets - 10 reps  · Thumb Abduction AROM on Table - 1 x daily - 7 x weekly - 3 sets - 10 reps  · Thumb AROM MP Blocking - 1 x daily - 7 x weekly - 3 sets - 10 reps  · Thumb AROM IP Blocking - 1 x daily - 7 x weekly - 3 sets - 10 reps      Patient rec'd fluidotherapy for soft tissue benefits  Patient also educated to complete desensitization with gentle scar tissue massage circles and js ,  Along with retrograde massage for edema. Patient demonstrated excellent understanding of both. Patient educated in use of coban wrap just to provide light protection of thumb at work. Demonstrated and verbalized understanding of not wrapping thumb too tight. RESTRICTIONS/PRECAUTIONS: none    Exercises/Interventions:     Therapeutic Exercises  Resistance / level Sets/sec Reps Notes                                                                                Neuromuscular Re-ed / Therapeutic Activities                                                 Manual Intervention                                                     Modalities: fluidotherapy     Patient education:   Patient also educated to complete desensitization with gentle scar tissue massage circles and js ,  Along with retrograde massage for edema. Patient demonstrated excellent understanding of both. Patient educated in use of coban wrap just to provide light protection of thumb at work. Demonstrated and verbalized understanding of not wrapping thumb too tight. Home Exercise Program:    HEP instruction: Written and verbal HEP instructions provided and reviewed:  ·  Access Code: [email or web address]  · URL: [email or web address]  · Date: 07/20/2021  · Prepared by: Charmayne Leeds  ·    · Exercises  · Thumb AROM Opposition To All Fingers - 1 x daily - 7 x weekly - 3 sets - 10 reps  · Seated Thumb Composite Flexion AROM - 1 x daily - 7 x weekly - 3 sets - 10 reps  · Thumb Abduction AROM on Table - 1 x daily - 7 x weekly - 3 sets - 10 reps  · Thumb AROM MP Blocking - 1 x daily - 7 x weekly - 3 sets - 10 reps  · Thumb AROM IP Blocking - 1 x daily - 7 x weekly - 3 sets - 10 reps          Therapeutic Exercise and NMR:  [x] (13655) Provided verbal/tactile cueing for activities related to strengthening, flexibility, endurance, ROM  for improvements in scapular, scapulothoracic and UE control with self care, reaching, carrying, lifting, house/yardwork, driving/computer work.    [] (52151) Provided verbal/tactile cueing for activities related to improving balance, coordination, kinesthetic sense, posture, motor skill, proprioception  to assist with  scapular, scapulothoracic and UE control with self care, reaching, carrying, lifting, house/yardwork, driving/computer work.   [] Comments:    Therapeutic Activities:    [] (80833 or 89862) Provided verbal/tactile cueing for activities related to improving balance, coordination, kinesthetic sense, posture, motor skill, proprioception and motor activation to allow for proper function of scapular, scapulothoracic and UE control with self care, carrying, lifting, driving/computer work  [] Comments:    Home Exercise Program:    [x] (90414) Reviewed/Progressed HEP activities related to strengthening, flexibility, endurance, ROM of scapular, scapulothoracic and UE control with self care, reaching, carrying, lifting, house/yardwork, driving/computer work  [] (67756) Reviewed/Progressed HEP activities related to improving balance, coordination, kinesthetic sense, posture, motor skill, proprioception of scapular, scapulothoracic and UE control with self care, reaching, carrying, lifting, house/yardwork, driving/computer work    [] Comments:    Manual Treatments:  PROM / STM / Oscillations-Mobs:  G-I, II, III, IV (PA's, Inf., Post.)  [x] (34753) Provided manual therapy to mobilize soft tissue/joints of cervical/CT, scapular GHJ and UE for the purpose of modulating pain, promoting relaxation,  increasing ROM, reducing/eliminating soft tissue swelling/inflammation/restriction, improving soft tissue extensibility and allowing for proper ROM for normal function with self care, reaching, carrying, lifting, house/yardwork, driving/computer work  [] Comments:    ADL Training:  [] (00911) Provided self-care/home management training related to activities of daily living and compensatory training, and/or use of adaptive equipment   [] Comments:     Splinting:  [] Fabrication of:   [] (85606) Orthotic/Prosthetic Management, subsequent encounter  [] (01099) Orthotic management and training (fitting and assessment)  [] Comments:      Charges:  Timed Code Treatment Minutes: 28    Total Treatment Minutes:  60     [x] EVAL (LOW) 71509   [] OT Re-eval (12657)  [] EVAL (MOD) 18236   [] EVAL (HIGH) 02749       [x] Alisia (43586) x   1  [] PJUGY(00621)  [] NMR (23898) x     [] Estim (attended) (56268)   [x] Manual ([id number]) x  1  [] US (90098)  [] TA (60998) x     [] Paraffin (88830)  [] ADL  ([phone]) x    [] Splint/L code:    [] Estim (unattended) 33 93 31)  [x] Fluidotherapy (44038)  [] Other:    GOALS:    GOALS:  Patient stated goal: increase strength and rom of left hand to be able to complete all vocation and avocational pursuits especially driving  []? Progressing: []? Met: []? Not Met: []? Adjusted     Therapist goals for Patient:   Short Term Goals: To be achieved in: 30 days  1. 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 Progressing: []? Met: []? Not Met: []? Adjusted  2. Pt will have increased  strength in left hand to 40 pounds to be able to reach and grasp cylindrical objects up to 5 pounds without difficulty  []? Progressing: []? Met: []? Not Met: []? Adjusted     Long Term Goals: To be achieved by della  1. Pt will will report a QuickDASH Symptom Severity Scale  12 or less indicating increased safety and functional independence in desired occupational pursuits by discharge. []? Progressing: []? Met: []? Not Met: []?  Adjusted  Electronically signed by:  Gaby Romero OT        Progression Towards Functional goals:  [] Patient is progressing as expected towards functional goals listed. [] Progression is slowed due to complexities listed. [] Progression has been slowed due to co-morbidities. [x] Plan just implemented, too soon to assess goals progression  [] All goals are met  [] Other:     ASSESSMENT:  See eval    Treatment/Activity Tolerance:  [x] Patient tolerated treatment well [] Patient limited by fatique  [] Patient limited by pain  [] Patient limited by other medical complications  [] Other:     Prognosis: [x] Good [] Fair  [] Poor    Patient Requires Follow-up: [x] Yes  [] No    PLAN: See eval  [] Continue per plan of care [] Alter current plan (see comments)  [x] Plan of care initiated [] Hold pending MD visit [] Discharge    Electronically signed by: Gardenia Smith OT    Note: If patient does not return for scheduled/ recommended follow up visits, this note will serve as a discharge from care along with most recent update on progress.

## 2021-07-27 ENCOUNTER — HOSPITAL ENCOUNTER (OUTPATIENT)
Dept: OCCUPATIONAL THERAPY | Age: 31
Setting detail: THERAPIES SERIES
Discharge: HOME | End: 2021-07-27

## 2021-07-27 PROCEDURE — 97110 THERAPEUTIC EXERCISES: CPT

## 2021-07-27 PROCEDURE — 97018 PARAFFIN BATH THERAPY: CPT

## 2021-07-27 NOTE — FLOWSHEET NOTE
[facility] - Outpatient Occupational Therapy      Hand Therapy Daily Treatment Note  Date:  2021    Patient: Jesi    : 1990   MRN: [id number]  Referring Physician:        Medical Diagnosis Information: Status post I&D of left thumb on 2021                                                  Insurance information:  self pay  Date of Injury: 2021 probable insect bite   Date of Surgery: 2021    Progress Report: []  Yes  [x]  No     Date Range for reporting period:  Beginnin2021  Endin2021    Progress report due (10 Rx/or 30 days whichever is less): visit #10 or     Recertification due (POC duration/ or 90 days whichever is less): visit #10 or 10/2021    Visit # Insurance Allowable Auth required? Date Range    Self pay []  Yes  [x]  No         Units approved Units used Date Range             Latex Allergy:  []No      []Yes  Pacemaker:  [] No       [] Yes     Preferred Language for Healthcare:   [x]English       []other:    Pain level: 010     SUBJECTIVE:  See eval    Functional Disability Index: Quick DASH: raw score = 20    OBJECTIVE:    RESTRICTIONS/PRECAUTIONS: none    Exercises/Interventions: Patient treatment focused on rom and soft tissue mobilization . Patient rec'd paraffin bath followed by soft tissue mobilization, joint blocking . Provided with silicone sleeves to wear over thumb for soft tissue benefits. Patient educated to wear as tolerated at work for protection and at night as tolerated. Patient then completed blocking arom all joints of thumb, patient then completed use of tray rolling a ball with a 2 pound ball on tray for 30 seconds . Patient then completed finger web times 10 with minimal irritation ending with paper crumbles times 5 reps. Discussed plan and possible early discharge.       Therapeutic Exercises  Resistance / level Sets/sec Reps Notes Neuromuscular Re-ed / Therapeutic Activities                                                 Manual Intervention                                                     Modalities:  Paraffin bath     Patient education:   Patient also educated to complete desensitization with gentle scar tissue massage circles and js ,  Along with retrograde massage for edema. Patient demonstrated excellent understanding of both. Patient educated in use of coban wrap just to provide light protection of thumb at work. Demonstrated and verbalized understanding of not wrapping thumb too tight. Home Exercise Program:    HEP instruction: Written and verbal HEP instructions provided and reviewed:  ·  Access Code: [email or web address]  · URL: [employer]/  · Date: 07/20/2021  · Prepared by: Odette Arango  ·    · Exercises  · Thumb AROM Opposition To All Fingers - 1 x daily - 7 x weekly - 3 sets - 10 reps  · Seated Thumb Composite Flexion AROM - 1 x daily - 7 x weekly - 3 sets - 10 reps  · Thumb Abduction AROM on Table - 1 x daily - 7 x weekly - 3 sets - 10 reps  · Thumb AROM MP Blocking - 1 x daily - 7 x weekly - 3 sets - 10 reps  · Thumb AROM IP Blocking - 1 x daily - 7 x weekly - 3 sets - 10 reps          Therapeutic Exercise and NMR:  [x] (08177) Provided verbal/tactile cueing for activities related to strengthening, flexibility, endurance, ROM  for improvements in scapular, scapulothoracic and UE control with self care, reaching, carrying, lifting, house/yardwork, driving/computer work.    [] (46946) Provided verbal/tactile cueing for activities related to improving balance, coordination, kinesthetic sense, posture, motor skill, proprioception  to assist with  scapular, scapulothoracic and UE control with self care, reaching, carrying, lifting, house/yardwork, driving/computer work.   [] Comments:    Therapeutic Activities:    [] (79862 or ) Provided verbal/tactile cueing for activities related to improving balance, coordination, kinesthetic sense, posture, motor skill, proprioception and motor activation to allow for proper function of scapular, scapulothoracic and UE control with self care, carrying, lifting, driving/computer work  [] Comments:    Home Exercise Program:    [x] (50770) Reviewed/Progressed HEP activities related to strengthening, flexibility, endurance, ROM of scapular, scapulothoracic and UE control with self care, reaching, carrying, lifting, house/yardwork, driving/computer work  [] (40741) Reviewed/Progressed HEP activities related to improving balance, coordination, kinesthetic sense, posture, motor skill, proprioception of scapular, scapulothoracic and UE control with self care, reaching, carrying, lifting, house/yardwork, driving/computer work    [] Comments:    Manual Treatments:  PROM / STM / Oscillations-Mobs:  G-I, II, III, IV (PA's, Inf., Post.)  [x] (64373) Provided manual therapy to mobilize soft tissue/joints of cervical/CT, scapular GHJ and UE for the purpose of modulating pain, promoting relaxation,  increasing ROM, reducing/eliminating soft tissue swelling/inflammation/restriction, improving soft tissue extensibility and allowing for proper ROM for normal function with self care, reaching, carrying, lifting, house/yardwork, driving/computer work  [] Comments:    ADL Training:  [] (82591) Provided self-care/home management training related to activities of daily living and compensatory training, and/or use of adaptive equipment   [] Comments:     Splinting:  [] Fabrication of:   [] (54964) Orthotic/Prosthetic Management, subsequent encounter  [] (19221) Orthotic management and training (fitting and assessment)  [] Comments:      Charges:  Timed Code Treatment Minutes: 28    Total Treatment Minutes: 28     [] EVAL (LOW) 90376   [] OT Re-eval (87969)  [] EVAL (MOD) 05438   [] EVAL (HIGH) 08813       [x] Alisia (20022) x   1  [] RMSUI(32467)  [] NMR (01033) x     [] Estim (attended) (70378) Hold pending MD visit [] Discharge    Electronically signed by: Eugenio Whyte OT    Note: If patient does not return for scheduled/ recommended follow up visits, this note will serve as a discharge from care along with most recent update on progress.

## 2021-08-04 ENCOUNTER — HOSPITAL ENCOUNTER (OUTPATIENT)
Dept: OCCUPATIONAL THERAPY | Age: 31
Setting detail: THERAPIES SERIES
Discharge: HOME | End: 2021-08-04

## 2021-08-04 NOTE — FLOWSHEET NOTE
Occupational Therapy  Cancellation/No-show Note  Patient Name:  Concepcion Salmeron   :  1990   Date:  2021  Cancelled visits to date: 0  No-shows to date: 1    Patient status for today's appointment patient:  []  Cancelled  []  Rescheduled appointment  [x]  No-show - 21     Reason given by patient:  []  Patient ill  []  Conflicting appointment  []  No transportation    []  Conflict with work  []  No reason given  []  Other:     Comments:      Phone call information:   [x]  Phone call made today to patient at 10:45 am at number provided:      []  Patient answered, conversation as follows:    [x]  Patient did not answer, message left as follows: Unable to LVM due to message stating VM not set up yet and call ending.   []  Phone call not made today    Electronically signed by:  Mairya Weiss, OTR/L [id number]

## 2021-08-17 ENCOUNTER — HOSPITAL ENCOUNTER (OUTPATIENT)
Dept: OCCUPATIONAL THERAPY | Age: 31
Setting detail: THERAPIES SERIES
Discharge: HOME | End: 2021-08-17

## 2021-08-17 NOTE — FLOWSHEET NOTE
Occupational Therapy  Cancellation/No-show Note  Patient Name:  Ada Israel   :  1990   Date:  2021  Cancelled visits to date: 0  No-shows to date: 3    Patient status for today's appointment patient:  []  Cancelled  []  Rescheduled appointment  [x]  No-show - 21, 8/10/21, 21     Reason given by patient:  []  Patient ill  []  Conflicting appointment  []  No transportation    []  Conflict with work  []  No reason given  []  Other:     Comments:      Phone call information:   [x]  Phone call made today to patient at 11:00 am at number provided:      []  Patient answered, conversation as follows:    [x]  Patient did not answer, message left as follows: Unable to LVM due to message stating VM not set up yet and call ending. Per no-show policy, will attempt to contact pt one more time and then cancel remaining appts due to consecutive no-shows.    []  Phone call not made today    Electronically signed by:     AUGUSTINE Fiore/ROSY [id number]

## 2021-08-25 ENCOUNTER — HOSPITAL ENCOUNTER (OUTPATIENT)
Dept: OCCUPATIONAL THERAPY | Age: 31
Setting detail: THERAPIES SERIES
Discharge: HOME | End: 2021-08-25

## 2021-08-25 NOTE — FLOWSHEET NOTE
Occupational Therapy  Cancellation/No-show Note  Patient Name:  Concepcion Salmeron   :  1990   Date:  2021  Cancelled visits to date: 0  No-shows to date: 4    Patient status for today's appointment patient:  []  Cancelled  []  Rescheduled appointment  [x]  No-show - 21, 8/10/21, 21, 21     Reason given by patient:  []  Patient ill  []  Conflicting appointment  []  No transportation    []  Conflict with work  []  No reason given  []  Other:     Comments:      Phone call information:   [x]  Phone call made today to patient at number provided:      []  Patient answered, conversation as follows:    [x]  Patient did not answer, message left as follows: Unable to LVM due to automated message stating \"This person is not accepting calls at this time. Please try your call again later. \" and call ending. Per no-show policy, all remaining appts cancelled due to consecutive no-shows.    []  Phone call not made today    Electronically signed by:     Mariya Weiss, OTR/L [id number]

## (undated) DEVICE — APPLICATOR MEDICATED 26 CC SOLUTION HI LT ORNG CHLORAPREP

## (undated) DEVICE — BANDAGE ESMARK 6INX3YD ST LTX FREE BL

## (undated) DEVICE — TOWEL,OR,DSP,ST,BLUE,STD,4/PK,20PK/CS: Brand: MEDLINE

## (undated) DEVICE — COLLECTOR SPEC RAYON LIQ STUART DBL FOR THRT VAG SKIN WND

## (undated) DEVICE — BANDAGE GZ W6XL80IN RAYON POLY CNFRM STRTCH LTWT

## (undated) DEVICE — STRIP WND PK W0.25INXL5YD IODO GZ TIGHTLY WVN CURAD

## (undated) DEVICE — PACK PROCEDURE SURG EXTREMITY MFFOP CUST

## (undated) DEVICE — GLOVE ORTHO 8   MSG9480

## (undated) DEVICE — SPONGE LAP W18XL18IN WHT COT 4 PLY FLD STRUNG RADPQ DISP ST

## (undated) DEVICE — SET IRRIG L94IN ID0.281IN W/ 4.5IN DST FLX CONN 2 LD ON OFF

## (undated) DEVICE — GLOVE SURG SZ 75 L12IN FNGR THK79MIL GRN LTX FREE

## (undated) DEVICE — SUTURE ETHLN SZ 3-0 L18IN NONABSORBABLE BLK PS-2 L19MM 3/8 1669H

## (undated) DEVICE — 3M™ COBAN™ NL STERILE NON-LATEX SELF-ADHERENT WRAP, 2083S, 3 IN X 5 YD (7,5 CM X 4,5 M), 24 ROLLS/CASE: Brand: 3M™ COBAN™

## (undated) DEVICE — DRAPE,U/ SHT,SPLIT,PLAS,STERIL: Brand: MEDLINE

## (undated) DEVICE — DRAPE HND W114XL142IN BLU POLYPR W O PCH FEN CRD AND TB HLDR

## (undated) DEVICE — GAUZE,SPONGE,4"X4",8PLY,STRL,LF,10/TRAY: Brand: MEDLINE

## (undated) DEVICE — SWAB CULT SGL AMIES W/O CHAR FOR THRT VAG SKIN HRT CULTSWAB